# Patient Record
Sex: FEMALE | Race: WHITE | Employment: OTHER | URBAN - METROPOLITAN AREA
[De-identification: names, ages, dates, MRNs, and addresses within clinical notes are randomized per-mention and may not be internally consistent; named-entity substitution may affect disease eponyms.]

---

## 2019-10-15 ENCOUNTER — HOSPITAL ENCOUNTER (OUTPATIENT)
Dept: LAB | Age: 64
Discharge: HOME | End: 2019-10-15
Payer: COMMERCIAL

## 2019-10-15 LAB
ALBUMIN SERPL-MCNC: 3.9 G/DL (ref 3.4–5)
ALBUMIN/GLOB SERPL: 1.4 {RATIO} (ref 0.8–1.7)
ALP SERPL-CCNC: 75 U/L (ref 45–117)
ALT SERPL-CCNC: 17 U/L (ref 13–56)
ANION GAP SERPL CALC-SCNC: 7 MMOL/L (ref 3–18)
AST SERPL-CCNC: 16 U/L (ref 10–38)
BILIRUB SERPL-MCNC: 0.3 MG/DL (ref 0.2–1)
BUN SERPL-MCNC: 20 MG/DL (ref 7–18)
BUN/CREAT SERPL: 20 (ref 12–20)
CALCIUM SERPL-MCNC: 8.8 MG/DL (ref 8.5–10.1)
CHLORIDE SERPL-SCNC: 109 MMOL/L (ref 100–111)
CHOLEST SERPL-MCNC: 201 MG/DL
CO2 SERPL-SCNC: 26 MMOL/L (ref 21–32)
CREAT SERPL-MCNC: 1.02 MG/DL (ref 0.6–1.3)
ERYTHROCYTE [DISTWIDTH] IN BLOOD BY AUTOMATED COUNT: 13.9 % (ref 11.6–14.5)
GLOBULIN SER CALC-MCNC: 2.8 G/DL (ref 2–4)
GLUCOSE SERPL-MCNC: 82 MG/DL (ref 74–99)
HCT VFR BLD AUTO: 42.1 % (ref 35–45)
HDLC SERPL-MCNC: 59 MG/DL (ref 40–60)
HDLC SERPL: 3.4 {RATIO} (ref 0–5)
HGB BLD-MCNC: 13.7 G/DL (ref 12–16)
LDLC SERPL CALC-MCNC: 125.6 MG/DL (ref 0–100)
LIPID PROFILE,FLP: ABNORMAL
MAGNESIUM SERPL-MCNC: 1.7 MG/DL (ref 1.6–2.6)
MCH RBC QN AUTO: 29.4 PG (ref 24–34)
MCHC RBC AUTO-ENTMCNC: 32.5 G/DL (ref 31–37)
MCV RBC AUTO: 90.3 FL (ref 74–97)
PLATELET # BLD AUTO: 227 K/UL (ref 135–420)
PMV BLD AUTO: 11.3 FL (ref 9.2–11.8)
POTASSIUM SERPL-SCNC: 4.8 MMOL/L (ref 3.5–5.5)
PROT SERPL-MCNC: 6.7 G/DL (ref 6.4–8.2)
RBC # BLD AUTO: 4.66 M/UL (ref 4.2–5.3)
SODIUM SERPL-SCNC: 142 MMOL/L (ref 136–145)
TRIGL SERPL-MCNC: 82 MG/DL (ref ?–150)
TSH SERPL DL<=0.05 MIU/L-ACNC: 0.56 UIU/ML (ref 0.36–3.74)
VLDLC SERPL CALC-MCNC: 16.4 MG/DL
WBC # BLD AUTO: 4.9 K/UL (ref 4.6–13.2)

## 2019-10-15 PROCEDURE — 85027 COMPLETE CBC AUTOMATED: CPT

## 2019-10-15 PROCEDURE — 84443 ASSAY THYROID STIM HORMONE: CPT

## 2019-10-15 PROCEDURE — 80053 COMPREHEN METABOLIC PANEL: CPT

## 2019-10-15 PROCEDURE — 80061 LIPID PANEL: CPT

## 2019-10-15 PROCEDURE — 36415 COLL VENOUS BLD VENIPUNCTURE: CPT

## 2019-10-15 PROCEDURE — 83735 ASSAY OF MAGNESIUM: CPT

## 2020-12-16 ENCOUNTER — TRANSCRIBE ORDER (OUTPATIENT)
Dept: SCHEDULING | Age: 65
End: 2020-12-16

## 2020-12-16 DIAGNOSIS — M54.9 DORSALGIA: ICD-10-CM

## 2020-12-16 DIAGNOSIS — M54.50 LUMBAGO: Primary | ICD-10-CM

## 2020-12-31 ENCOUNTER — HOSPITAL ENCOUNTER (OUTPATIENT)
Dept: MRI IMAGING | Age: 65
Discharge: HOME | End: 2020-12-31
Attending: ORTHOPAEDIC SURGERY
Payer: COMMERCIAL

## 2020-12-31 DIAGNOSIS — M54.9 DORSALGIA: ICD-10-CM

## 2020-12-31 DIAGNOSIS — M54.50 LUMBAGO: ICD-10-CM

## 2020-12-31 PROCEDURE — 72148 MRI LUMBAR SPINE W/O DYE: CPT

## 2021-11-22 RX ORDER — ATROPINE SULFATE 0.1 MG/ML
0.5 INJECTION INTRAVENOUS
Status: CANCELLED | OUTPATIENT
Start: 2021-11-22 | End: 2021-11-23

## 2021-11-22 RX ORDER — DIPHENHYDRAMINE HYDROCHLORIDE 50 MG/ML
50 INJECTION, SOLUTION INTRAMUSCULAR; INTRAVENOUS ONCE
Status: CANCELLED | OUTPATIENT
Start: 2021-11-22 | End: 2021-11-22

## 2021-11-22 RX ORDER — EPINEPHRINE 0.1 MG/ML
1 INJECTION INTRACARDIAC; INTRAVENOUS
Status: CANCELLED | OUTPATIENT
Start: 2021-11-22 | End: 2021-11-23

## 2021-11-22 RX ORDER — SODIUM CHLORIDE 0.9 % (FLUSH) 0.9 %
5-40 SYRINGE (ML) INJECTION EVERY 8 HOURS
Status: CANCELLED | OUTPATIENT
Start: 2021-11-22

## 2021-11-22 RX ORDER — DEXTROMETHORPHAN/PSEUDOEPHED 2.5-7.5/.8
1.2 DROPS ORAL
Status: CANCELLED | OUTPATIENT
Start: 2021-11-22

## 2021-11-22 RX ORDER — SODIUM CHLORIDE 0.9 % (FLUSH) 0.9 %
5-40 SYRINGE (ML) INJECTION AS NEEDED
Status: CANCELLED | OUTPATIENT
Start: 2021-11-22

## 2021-11-22 NOTE — H&P
Assessment/Plan  # Detail Type Description    1. Assessment Personal history of colonic polyps (Z86.010). Impression Last Colonoscopy  1/09/2015 Impression /Pathology revealed: Moderate tortuosity of the sigmoid colon was found. Moderate diverticulosis found in the sigmoid. Muscular hypertonia. A single polyp in the descending colon removed by cold snare polypectomy. Internal hemorrhoids found. Normal mucosa    Colon descending: Adenomatous polyp fragments. .    Patient Plan she has one bm 2 to 3 times daily. no abdominal surgery. no fx hx of colon cancer. she is due to have a repeat colonoscopy. I explained to her the procedure of colonoscopy and the risks involved which include but not limited to reaction to sedation, bleeding, perforation, infection or missing a lesion if bowels are not well prepped or are unusually tortuous. she agreed to proceed with the procedure and answered her questions. I gave her the Suprep to clean her bowels. I advised her to take  extra laxatives for few days before incase she is on the constipated side to assure adequate bowel prep.  she has no dm , CAD or CVA         2. Assessment GERD w/o esophagitis (K21.9). Patient Plan has frequent heartburns specially when she takes NSAID's she doesn't take any medication to treat it because afraid of the side effect. told her beside watching her diet and not eating at night she should minimize her intake of NSAID's and if necessary it has to be taken with PPI to protect her esophagus. But also she needs to treat her symptoms now daily for 2 to 4 weeks and then as needed. she should lift the head of the bed with a wedge. .. she denied having n/vx or dysphagia. she has gained weight. in my opinion no need to repeat her EGD the one she had on Jan 9, 2015 showed mild localized grade A erosive esophagitis and tiny hiatal hernia.  gastric biopsy was negative.     I encouraged her to exercise on regular basis to improve her joint problem and loose some weight instead of relaying on Taking NSAID's that do not work. she has t treat her GERD by taking PPI initially daily and then as needed         3. Assessment Body mass index [BMI] 35.0-35.9, adult (Z68.35). Patient Plan High BMI 35.7. I encouraged her to continue watching her diet and exercising. I gave her some practical advices and tips to achieve this. This 61year old  patient was referred by Rock Larson. This 61year old female presents for Hx polyp/colon cancer and GERD. History of Present Illness  1. Hx polyp/colon cancer   Prior screening:  colonoscopy. Risk Factors: family history - parent and Father colon cancer. Associated symptoms include constipation. Pertinent negatives include abdominal pain, change in bowel habits, decreased appetite, diarrhea, melena, nausea, vomiting and weight loss. Additional information: Patient has family history of colon cancer and Last Colonoscopy 01//09/2015. hx of adenomatous polyps. 5 yrs recall Bm. 1 daily. 2.  GERD   The onset of the heartburn was 20 years ago. The problem is getting worse. The patient reports heartburn. It occurs daily. The symptoms are aggravated by eating, tomatoes and chocolate. Associated symptoms include chronic cough and reflux. Pertinent negatives include melena, nausea, sore throat, vomiting and weight loss.           Problem List  Problem Description Onset Date Chronic Clinical Status Notes   Gastroesophageal reflux disease 12/03/2014 N     Dysphagia 12/03/2014 N     History of polyp of colon 12/03/2014 N     Eruption 12/03/2014 N     Obesity 12/03/2014 N     Diverticular disease of colon 01/28/2015 N     Dysphagia 01/28/2015 N     History of polyp of colon 01/28/2015 N     Gastroesophageal reflux disease 01/28/2015 N     Hypothyroidism 02/05/2013 Y     Acquired hypothyroidism 08/12/2011 Y     Depressive disorder 08/12/2011 Y     Gastroesophageal reflux disease 08/12/2011 Y     Degenerative joint disease involving multiple joints 2011 Y       Past Medical/Surgical History   (Detailed)  Disease/disorder Onset Date Management Date Comments     right total knee replacement       tummy tuck       left shoulder surg 1977    GERD       Thyroid disease       Depression       Anxiety             Family History   (Detailed)    Relationship Family Member Name  Age at Death Condition Onset Age Cause of Death   Father    Hypertension  N   Father    Alcoholism  N   Father  Y    N   Father  Y  Cancer, colon  Y   Mother    Atrial Fibrillation  N   Mother    Thyroid disorder  N     Social History  (Detailed)  Tobacco use reviewed. Preferred language is [place]. The patient does not need an . Education/Employment/Occupation  Employment History Status Retired Restrictions   RN         Marital Status/Family/Social Support  Marital status:      Tobacco use status: Never smoked tobacco.    Smoking status: Never smoker. Tobacco Screening  Patient has never used tobacco. Patient has not used tobacco in the last 30 days. Patient has not used smokeless tobacco in the last 30 days. Smoking Status  Type Smoking Status Usage Per Day Years Used Pack Years Total Pack Years    Never smoker         Tobacco/Vaping Exposure  No passive smoke exposure. Alcohol  There is a history of alcohol use. consumed rarely. Caffeine  The patient uses caffeine: coffee. Lifestyle  Moderate activity level. Exercise includes walking.       Medications (active prior to today)  Medication Instructions Start Date Stop Date Refilled Elsewhere   Vitamin D3 1,000 unit tablet take 2 tablet by oral route  every day //   Y   turmeric root extract 500 mg capsule  //   Y   Fish Oil 1,000 mg (120 mg-180 mg) capsule  //   Y   CoQ-10 100 mg capsule  //   Y   OMEPRAZOLE DR CAPS 20MG TAKE 1 CAPSULE DAILY 30 MINUTES TO 1 HOUR BEFORE A MEAL 02/10/2020  02/10/2020 N   Valium 5 mg tablet TAKE 1 TABLET BY MOUTH EVERY DAY AS NEEDED 08/05/2020 08/05/2020 N   triamcinolone acetonide 0.1 % topical cream apply by topical route 2 times every day a thin layer to the affected area(s) 10/22/2020   N   SYNTHROID TABS 150MCG TAKE 1 TABLET DAILY 11/09/2020 11/09/2020 N   BUPROPION HCL TABS 75MG TAKE 1 TABLET TWICE A DAY 11/09/2020 11/09/2020 N       Medication Reconciliation  Medications reconciled today.     Medication Reviewed  Adherence Medication Name Sig Desc Elsewhere Status   taking as directed Fish Oil 1,000 mg (120 mg-180 mg) capsule  Y Verified   taking as directed SYNTHROID TABS 150MCG TAKE 1 TABLET DAILY N Verified   taking as directed turmeric root extract 500 mg capsule  Y Verified   taking as directed BUPROPION HCL TABS 75MG TAKE 1 TABLET TWICE A DAY N Verified   taking as directed Vitamin D3 1,000 unit tablet take 2 tablet by oral route  every day Y Verified   taking as directed triamcinolone acetonide 0.1 % topical cream apply by topical route 2 times every day a thin layer to the affected area(s) N Verified   taking as directed Valium 5 mg tablet TAKE 1 TABLET BY MOUTH EVERY DAY AS NEEDED N Verified   taking as directed OMEPRAZOLE DR CAPS 20MG TAKE 1 CAPSULE DAILY 30 MINUTES TO 1 HOUR BEFORE A MEAL N Verified   taking as directed CoQ-10 100 mg capsule  Y Verified     Medications (Added, Continued or Stopped today)  Start Date Medication Directions PRN Status PRN Reason Instruction Stop Date   11/09/2020 BUPROPION HCL TABS 75MG TAKE 1 TABLET TWICE A DAY N       CoQ-10 100 mg capsule  N       Fish Oil 1,000 mg (120 mg-180 mg) capsule  N      09/21/2021 Golytely 236 gram-22.74 gram-6.74 gram-5.86 gram oral solution take as prescribe by physician N      02/10/2020 OMEPRAZOLE DR CAPS 20MG TAKE 1 CAPSULE DAILY 30 MINUTES TO 1 HOUR BEFORE A MEAL N      11/09/2020 SYNTHROID TABS 150MCG TAKE 1 TABLET DAILY N      10/22/2020 triamcinolone acetonide 0.1 % topical cream apply by topical route 2 times every day a thin layer to the affected area(s) N       turmeric root extract 500 mg capsule  N      08/05/2020 Valium 5 mg tablet TAKE 1 TABLET BY MOUTH EVERY DAY AS NEEDED N       Vitamin D3 1,000 unit tablet take 2 tablet by oral route  every day N        Allergies  Ingredient Reaction (Severity) Medication Name Comment   ERYTHROMYCIN BASE Unknown       Reviewed, no changes. Orders  Status Lab Order Time Frame Comments   scheduled Referral: [facility]. Evaluate and treat. result received CBC with Diff     result received TSH     result received Lipid Measured LDL     scheduled Referral: Lara Abdi MD. Consult. [address]    insurance card, picture ID, all medications   scheduled * MRI LUMBAR SPINE W/O DYE     scheduled * THORACIC SPINE W/O DYE     result received CMP     result received TSH     result received Lipid Measured LDL     result received Urine Culture     scheduled Nerve Conduction 5-6 Studies Right wrist  [address]    [phone]    arrival time 2:45   result received Total T4     result received TSH     result received Lipid with LDL/HDL Ratio AU     result received Basic Metabolic Panel (8) AU     result received FSH, Serum     ordered MRI CERVICAL SPINE W/O DYE     scheduled Referrals: Neurosurgery. Cr Moore MD. Location: . Consult     ordered Lipid with LDL/HDL Ratio AU     ordered TSH     ordered Basic Metabolic Panel (8) AU     ordered CBC With Differential/Platelet     result received Basic Metabolic Panel (8) AU     result received CBC With Differential/Platelet     result received Lipid with LDL/HDL Ratio AU     result received TSH     scheduled Referrals: Sleep Health.  Chele Ashley MD. Consult     result received Home Sleep Study     ordered follow-up visit with Alana Ponce MD in 3 Weeks in 3 Weeks    result received CHEST X-RAY PA & LAT     result received Complete PFT     ordered Auto CPAP (straight pressure or with expiratory relief technology)  Auto Range: 5 X20; then download. Additional Instructions:  Setup. New machine. Ramp PRN. Special Instructions/Comments: DME- MED  Patient request.   ordered Heated Humidity     ordered Nasal Mask     ordered Nasal Cushion Replacement     ordered Nasal Pillow Replacement     ordered Headgear     ordered Tubing     ordered Heated wire tubing     ordered Disposable Filters     ordered Non-disposable filters     ordered Water chamber     ordered Chinstrap     ordered CPAP Mask of Choice     ordered follow-up visit with Abdulaziz Schultz MD in 3 Weeks in 3 Weeks    result received CT CHEST W/O DYE  Please schedule at [facility]   scheduled Referrals: Otolaryngology. Bree Martinez MD. Evaluate and treat  Please evaluate the patient for chronic cough. Normal PFT and no response to ICS/LABA inhaler. GERD controlled on PPI. ordered follow-up visit with Abdulaziz Schultz MD in 2 Months in 2 Months    ordered Auto CPAP (straight pressure or with expiratory relief technology)  Auto Range: 5 X10; then download. Ramp PRN. Special Instructions/Comments: Start up pressure 4 cwp. Refit for the mask of choice. Intolerance to CPAP and mask. .   ordered Heated Humidity     ordered Nasal Mask     ordered Nasal Cushion Replacement     ordered Nasal Pillow Replacement     ordered Headgear     ordered Tubing     ordered Heated wire tubing     ordered Disposable Filters     ordered Non-disposable filters     ordered Water chamber     ordered Chinstrap     ordered CPAP Mask of Choice     ordered Methacholine Challenge Test     ordered Complete PFT     result received TSH     result received Vitamin D 25     result received CBC With Differential/Platelet     result received Comp Metabolic Panel (14) AU     result received Lipid Panel calc LDL     result received HCV Antibody     ordered Comp Metabolic Panel (14) AU     ordered CBC, Platelet: No Differential     ordered Lipid Panel calc LDL     ordered Magnesium, Serum AU     ordered TSH     ordered Magnesium, Serum AU     ordered TSH     ordered Thyroxine (T4) Free, Direct     ordered CBC With Differential/Platelet     ordered Comp Metabolic Panel (14) AU     ordered Lipid Panel calc LDL     ordered Triiodothryonine (T3)     ordered CBC, Platelet: No Differential     ordered Comp Metabolic Panel (14) AU     ordered Lipid Panel calc LDL     ordered TSH     ordered Thyroxine (T4) Free, Direct     ordered Magnesium, Serum AU     ordered Triiodothryonine (T3)     ordered Basic Metabolic Panel (8) AU     ordered Lipid Panel calc LDL     ordered Magnesium, Serum AU     ordered TSH         Review of Systems  System Neg/Pos Details   Constitutional Negative Chills, Fever, Malaise and Weight loss. ENMT Negative Ear infections, Nasal congestion, Sinus Infection and Sore throat. Eyes Negative Double vision and Eye pain. Respiratory Positive Chronic cough. Respiratory Negative Asthma, Pleuritic pain and Wheezing. Cardio Negative Chest pain, Edema and Irregular heartbeat/palpitations. GI Positive Constipation, Reflux. GI Negative Abdominal pain, Change in bowel habits, Decreased appetite, Diarrhea, Heartburn, Hematemesis, Hematochezia, Melena, Nausea and Vomiting.  Negative Dysuria, Hematuria, Urinary frequency, Urinary incontinence and Urinary retention. Endocrine Negative Cold intolerance, Heat intolerance and Increased thirst.   Neuro Negative Dizziness, Headache, Numbness, Tremors and Vertigo. Psych Negative Anxiety, Depression and Increased stress. Integumentary Negative Hives, Pruritus and Rash. MS Negative Back pain, Joint pain and Myalgia. Hema/Lymph Negative Easy bleeding, Easy bruising and Lymphadenopathy. Reproductive Negative Breast lumps, Breast pain and Vaginal discharge.        Vital Signs   Height  Time ft in cm Last Measured Height Position   2:04 PM 5.0 7.00 170.18 01/08/2018      Weight/BSA/BMI  Time lb oz kg Context BMI kg/m2 BSA m2 2:04 .00  103.419 dressed with shoes 35.71      Date/Time Temp Pulse BP Arterial Line 1 BP (mmHg) BP Patient Position Resp SpO2 O2 Device O2 Flow Rate (L/min) Level of Consciousness MEWS Score Weight   11/23/21 0844        None (Room air)       11/23/21 0836 97.5 °F (36.4 °C) 77 125/81  At rest; Supine 18 97 % None (Room air)  Alert (0) 1 100.7 kg (222 lb 1.6 oz)       Physical  Exam  Exam Findings Details   Constitutional * Nourishment - obese. Constitutional Normal No acute distress. Well developed. Eyes Normal General - Right: Normal, Left: Normal. Conjunctiva - Right: Normal, Left: Normal. Sclera - Right: Normal, Left: Normal. Cornea - Right: Normal, Left: Normal. Pupil - Right: Normal, Left: Normal.   Nose/Mouth/Throat Normal Lips/teeth/gums - Normal. Tongue - Normal. Buccal mucosa - Normal. Palate & uvula - Normal.   Neck Exam Normal Inspection - Normal. Palpation - Normal. Thyroid gland - Normal. Cervical lymph nodes - Normal.   Respiratory Normal Inspection - Normal. Auscultation - Normal. Percussion - Normal. Cough - Absent. Effort - Normal.   Cardiovascular Normal Heart rate - Regular rate. Heart sounds - Normal S1, Normal S2. Murmurs - None. Extremities - No edema. Abdomen Normal Inspection - Normal. Appliance(s) - None. Abdominal muscles - Normal. Auscultation - Normal. Percussion - Normal. Anterior palpation - Normal, No guarding, No rebound. CVA tenderness - None. Umbilicus - Normal. Abdominal reflexes - Normal. No abdominal tenderness. No hepatic enlargement. No splenic enlargement. No hernia. No Ascites. No palpable mass. Shi's sign - Negative. Skin Normal Inspection - Normal.   Musculoskeletal Normal Hands - Left: Normal, Right: Normal.   Extremity Normal No cyanosis. No edema. Clubbing - Absent.    Neurological Normal Level of consciousness - Normal. Orientation - Normal. Memory - Normal. Motor - Normal. Balance & gait - Normal. Coordination - Normal. Fine motor skills - Normal. DTRs - Normal.   Psychiatric Normal Orientation - Oriented to time, place, person & situation. Not anxious. Appropriate mood and affect. Behavior appropriate for age. Sufficient language. No memory loss.      Active Patient Care Team Members  Name Contact Agency Type Support Role Relationship Active Date Inactive Date Specialty   Eder Joyner   encounter provider    Pulmonary Medicine   Harsha Boston   Patient provider PCP   Family Practice   Bia Hernandez   encounter provider    Gastroenterology   Newton Sicard   Emergency Contact Spouse      Newton Sicard    Spouse            No change in H&P

## 2021-11-23 ENCOUNTER — HOSPITAL ENCOUNTER (OUTPATIENT)
Age: 66
Setting detail: OUTPATIENT SURGERY
Discharge: HOME | End: 2021-11-23
Attending: INTERNAL MEDICINE | Admitting: INTERNAL MEDICINE
Payer: COMMERCIAL

## 2021-11-23 VITALS
DIASTOLIC BLOOD PRESSURE: 78 MMHG | HEIGHT: 66 IN | OXYGEN SATURATION: 97 % | SYSTOLIC BLOOD PRESSURE: 102 MMHG | TEMPERATURE: 97.5 F | RESPIRATION RATE: 18 BRPM | BODY MASS INDEX: 35.69 KG/M2 | WEIGHT: 222.1 LBS | HEART RATE: 84 BPM

## 2021-11-23 PROCEDURE — 76040000007: Performed by: INTERNAL MEDICINE

## 2021-11-23 PROCEDURE — 77030039961 HC KT CUST COLON BSC -D: Performed by: INTERNAL MEDICINE

## 2021-11-23 PROCEDURE — 74011250636 HC RX REV CODE- 250/636: Performed by: INTERNAL MEDICINE

## 2021-11-23 PROCEDURE — 99153 MOD SED SAME PHYS/QHP EA: CPT | Performed by: INTERNAL MEDICINE

## 2021-11-23 PROCEDURE — 77030040361 HC SLV COMPR DVT MDII -B: Performed by: INTERNAL MEDICINE

## 2021-11-23 PROCEDURE — 2709999900 HC NON-CHARGEABLE SUPPLY: Performed by: INTERNAL MEDICINE

## 2021-11-23 PROCEDURE — 77010033678 HC OXYGEN DAILY

## 2021-11-23 PROCEDURE — G0500 MOD SEDAT ENDO SERVICE >5YRS: HCPCS | Performed by: INTERNAL MEDICINE

## 2021-11-23 RX ORDER — FENTANYL CITRATE 50 UG/ML
100 INJECTION, SOLUTION INTRAMUSCULAR; INTRAVENOUS
Status: DISCONTINUED | OUTPATIENT
Start: 2021-11-23 | End: 2021-11-23 | Stop reason: HOSPADM

## 2021-11-23 RX ORDER — FLUMAZENIL 0.1 MG/ML
0.2 INJECTION INTRAVENOUS
Status: DISCONTINUED | OUTPATIENT
Start: 2021-11-23 | End: 2021-11-23 | Stop reason: HOSPADM

## 2021-11-23 RX ORDER — SODIUM CHLORIDE 9 MG/ML
1000 INJECTION, SOLUTION INTRAVENOUS CONTINUOUS
Status: DISCONTINUED | OUTPATIENT
Start: 2021-11-23 | End: 2021-11-23 | Stop reason: HOSPADM

## 2021-11-23 RX ORDER — MIDAZOLAM HYDROCHLORIDE 1 MG/ML
.25-5 INJECTION, SOLUTION INTRAMUSCULAR; INTRAVENOUS
Status: DISCONTINUED | OUTPATIENT
Start: 2021-11-23 | End: 2021-11-23 | Stop reason: HOSPADM

## 2021-11-23 RX ORDER — NALOXONE HYDROCHLORIDE 0.4 MG/ML
0.4 INJECTION, SOLUTION INTRAMUSCULAR; INTRAVENOUS; SUBCUTANEOUS
Status: DISCONTINUED | OUTPATIENT
Start: 2021-11-23 | End: 2021-11-23 | Stop reason: HOSPADM

## 2021-11-23 RX ADMIN — SODIUM CHLORIDE 1000 ML: 9 INJECTION, SOLUTION INTRAVENOUS at 08:56

## 2021-11-23 NOTE — DISCHARGE INSTRUCTIONS
Vera Wesley  [id number]  1955    COLON DISCHARGE INSTRUCTIONS    Discomfort:  Redness at IV site- apply warm compress to area; if redness or soreness persist- contact your physician  There may be a slight amount of blood passed from the rectum  Gaseous discomfort- walking, belching will help relieve any discomfort  You may not operate a vehicle til the next day. You may not engage in an occupation involving machinery or appliances til the next day. You may not drink alcoholic beverages til the next day. DIET:   High fiber diet. ACTIVITY:  You may not  resume your normal daily activities til the next day. it is recommended that you spend the remainder of the day resting -  avoid any strenuous activity. CALL M.D.  IF ANY SIGN OF:   Increasing pain, nausea, vomiting  Abdominal distension (swelling)  New increased bleeding (oral or rectal)  Fever (chills)  Pain in chest area  Bloody discharge from nose or mouth  Shortness of breath    You may  take any Advil, Aspirin, Ibuprofen, Motrin, Aleve, or Goodys but preferably Tylenol as needed for pain. Post procedure diagnosis:  sigmoid diverticulosis; Follow-up Instructions: Your follow up colonoscopy will be in 5 years. We will notify you the results of your biopsy by letter within 2 weeks. Komal Javier MD  November 23, 2021     DISCHARGE SUMMARY from Nurse    PATIENT INSTRUCTIONS:    After general anesthesia or intravenous sedation, for 24 hours or while taking prescription Narcotics:  · Limit your activities  · Do not drive and operate hazardous machinery  · Do not make important personal or business decisions  · Do  not drink alcoholic beverages  · If you have not urinated within 8 hours after discharge, please contact your surgeon on call.     Report the following to your surgeon:  · Excessive pain, swelling, redness or odor of or around the surgical area  · Temperature over 100.5  · Nausea and vomiting lasting longer than 4 hours or if unable to take medications  · Any signs of decreased circulation or nerve impairment to extremity: change in color, persistent  numbness, tingling, coldness or increase pain  · Any questions    What to do at Home:  Recommended activity: Activity as tolerated, noted above    If you experience any of the following symptoms as above, please follow up with Dr Jacob Ferraro. *  Please give a list of your current medications to your Primary Care Provider. *  Please update this list whenever your medications are discontinued, doses are      changed, or new medications (including over-the-counter products) are added. *  Please carry medication information at all times in case of emergency situations. These are general instructions for a healthy lifestyle:    No smoking/ No tobacco products/ Avoid exposure to second hand smoke  Surgeon General's Warning:  Quitting smoking now greatly reduces serious risk to your health. Obesity, smoking, and sedentary lifestyle greatly increases your risk for illness    A healthy diet, regular physical exercise & weight monitoring are important for maintaining a healthy lifestyle    You may be retaining fluid if you have a history of heart failure or if you experience any of the following symptoms:  Weight gain of 3 pounds or more overnight or 5 pounds in a week, increased swelling in our hands or feet or shortness of breath while lying flat in bed. Please call your doctor as soon as you notice any of these symptoms; do not wait until your next office visit. The discharge information has been reviewed with the patient and spouse. The patient and spouse verbalized understanding. Discharge medications reviewed with the patient and spouse and appropriate educational materials and side effects teaching were provided.   Patient armband removed and shredded    ___________________________________________________________________________________________________________________________________

## 2021-11-23 NOTE — PROCEDURES
[facility]  Colonoscopy Procedure Report  _______________________________________________________  Patient: Fritz Richey                                        Attending Physician: Jamie Cavazos MD    Patient ID: [id number]                                    Referring Physician: Marycruz Espitia DO    Exam Date: 2021      Introduction: A  61 y.o. female patient, presents for inpatient Colonoscopy    Indications: Last Colonoscopy  2015 by myself: Moderate tortuosity of the sigmoid colon was found. Moderate diverticulosis found in the sigmoid. Muscular hypertonia. A single 4 mm Adenomatous polyp in the descending colon removed by cold snare polypectomy. Internal hemorrhoids found. Normal mucosa. She has one bm 2 to 3 times daily. no abdominal surgery. Father had hx of colon cancer and  in his fifties.       Consent: The benefits, risks, and alternatives to the procedure were discussed and informed consent was obtained from the patient. Preparation: EKG, pulse, pulse oximetry and blood pressure were monitored throughout the procedure. ASA Classification: Class II- . The heart is an S1-S2 and regular heart rate and rhythm. Lungs are clear to auscultation and percussion. Abdomen is soft, nondistended, and nontender. Mental Status: awake, alert, and oriented to person, place, and time    Medications:  · Fentanyl 100 mcg IV before procedure. · Versed 5 mg IV throughout the procedure. Rectal Exam: Normal Rectal Exam. No Blood. Pathology Specimens:  0    Procedure: The colonoscope was passed with ease through the anus under direct visualization and advanced to the cecum and 6 cm inside the terminal ileum. Retroflexion is made in the ascending colon. The scope was withdrawn and the mucosa was carefully examined. The quality of the preparation was excellent. The views were excellent. The patient's toleration of the procedure was excellent. The exam was done twice to the cecum. Total time is 18 minutes and withdrawal time is 15 minutes. Findings:    Rectum:   Small internal hemorrhoids. Sigmoid:   Slightly tortuous sigmoid colon with moderate sigmoid diverticulosis and muscular hypertonia. Descending Colon:   Normal   Transverse Colon:   Normal   Ascending Colon:   Normal  Cecum:   Normal  Terminal Ileum:   Normal.     Unplanned Events: There were no unplanned events. Estimated Blood Loss: None  IMPLANTS: * No implants in log *  Impressions: Small internal hemorrhoids. Slightly tortuous sigmoid colon with moderate sigmoid diverticulosis and muscular hypertonia. Normal Mucosa. No blood, polyps or AVM found. Complications: None; patient tolerated the procedure well. Recommendations:  · Discharge home when standard parameters are met. · Resume a high fiber diet. · Resume own medications. Avoid all NSAID's for  · Colonoscopy recommendation in 5 years because father  of colon cancer at age 46 yo.   · Take Miralax and/ or Colace 100 mg on regular basis if constipated    Procedure Codes:     COLONOSCOPY [[id number] (Type: Custom)]    Endoscope Information:  Model Number(s)    [id number]   Assistant: None  Signed By: Miguel A Fried MD Date: 2021

## 2022-12-23 ENCOUNTER — HOSPITAL ENCOUNTER (INPATIENT)
Age: 67
LOS: 2 days | Discharge: HOME | DRG: 176 | End: 2022-12-25
Attending: EMERGENCY MEDICINE | Admitting: HOSPITALIST
Payer: MEDICARE

## 2022-12-23 ENCOUNTER — HOSPITAL ENCOUNTER (OUTPATIENT)
Dept: LAB | Age: 67
DRG: 176 | End: 2022-12-23
Payer: MEDICARE

## 2022-12-23 ENCOUNTER — APPOINTMENT (OUTPATIENT)
Dept: GENERAL RADIOLOGY | Age: 67
DRG: 176 | End: 2022-12-23
Attending: EMERGENCY MEDICINE
Payer: MEDICARE

## 2022-12-23 ENCOUNTER — APPOINTMENT (OUTPATIENT)
Dept: CT IMAGING | Age: 67
DRG: 176 | End: 2022-12-23
Attending: PHYSICIAN ASSISTANT
Payer: MEDICARE

## 2022-12-23 DIAGNOSIS — N30.00 ACUTE CYSTITIS WITHOUT HEMATURIA: ICD-10-CM

## 2022-12-23 DIAGNOSIS — I26.99 ACUTE PULMONARY EMBOLISM WITHOUT ACUTE COR PULMONALE, UNSPECIFIED PULMONARY EMBOLISM TYPE (HCC): Primary | ICD-10-CM

## 2022-12-23 PROBLEM — E03.9 ACQUIRED HYPOTHYROIDISM: Status: ACTIVE | Noted: 2022-12-23

## 2022-12-23 PROBLEM — J45.901 ASTHMA EXACERBATION: Status: ACTIVE | Noted: 2022-12-23

## 2022-12-23 PROBLEM — N39.0 UTI (URINARY TRACT INFECTION): Status: ACTIVE | Noted: 2022-12-23

## 2022-12-23 LAB
ALBUMIN SERPL-MCNC: 3.1 G/DL (ref 3.4–5)
ALBUMIN/GLOB SERPL: 0.9 {RATIO} (ref 0.8–1.7)
ALP SERPL-CCNC: 96 U/L (ref 45–117)
ALT SERPL-CCNC: 39 U/L (ref 13–56)
ANION GAP SERPL CALC-SCNC: 7 MMOL/L (ref 3–18)
APPEARANCE UR: ABNORMAL
APTT PPP: 34 SEC (ref 23–36.4)
APTT PPP: >180 SEC (ref 23–36.4)
AST SERPL-CCNC: 29 U/L (ref 10–38)
B PERT DNA SPEC QL NAA+PROBE: NOT DETECTED
BACTERIA URNS QL MICRO: ABNORMAL /HPF
BASOPHILS # BLD: 0 K/UL (ref 0–0.1)
BASOPHILS # BLD: 0 K/UL (ref 0–0.1)
BASOPHILS NFR BLD: 0 % (ref 0–2)
BASOPHILS NFR BLD: 0 % (ref 0–2)
BILIRUB SERPL-MCNC: 0.6 MG/DL (ref 0.2–1)
BILIRUB UR QL: NEGATIVE
BNP SERPL-MCNC: 52 PG/ML (ref 0–900)
BORDETELLA PARAPERTUSSIS PCR, BORPAR: NOT DETECTED
BUN SERPL-MCNC: 15 MG/DL (ref 7–18)
BUN/CREAT SERPL: 14 (ref 12–20)
C PNEUM DNA SPEC QL NAA+PROBE: NOT DETECTED
CALCIUM SERPL-MCNC: 8.8 MG/DL (ref 8.5–10.1)
CHLORIDE SERPL-SCNC: 107 MMOL/L (ref 100–111)
CO2 SERPL-SCNC: 24 MMOL/L (ref 21–32)
COLOR UR: YELLOW
CREAT SERPL-MCNC: 1.1 MG/DL (ref 0.6–1.3)
D DIMER PPP FEU-MCNC: 2.02 UG/ML(FEU)
D DIMER PPP FEU-MCNC: 2.15 UG/ML(FEU)
DIFFERENTIAL METHOD BLD: ABNORMAL
DIFFERENTIAL METHOD BLD: ABNORMAL
EOSINOPHIL # BLD: 0 K/UL (ref 0–0.4)
EOSINOPHIL # BLD: 0.1 K/UL (ref 0–0.4)
EOSINOPHIL NFR BLD: 0 % (ref 0–5)
EOSINOPHIL NFR BLD: 1 % (ref 0–5)
EPITH CASTS URNS QL MICRO: ABNORMAL /LPF (ref 0–5)
ERYTHROCYTE [DISTWIDTH] IN BLOOD BY AUTOMATED COUNT: 13.1 % (ref 11.6–14.5)
ERYTHROCYTE [DISTWIDTH] IN BLOOD BY AUTOMATED COUNT: 13.2 % (ref 11.6–14.5)
FLUAV H1 2009 PAND RNA SPEC QL NAA+PROBE: NOT DETECTED
FLUAV H1 RNA SPEC QL NAA+PROBE: NOT DETECTED
FLUAV H3 RNA SPEC QL NAA+PROBE: NOT DETECTED
FLUAV SUBTYP SPEC NAA+PROBE: NOT DETECTED
FLUBV RNA SPEC QL NAA+PROBE: NOT DETECTED
GLOBULIN SER CALC-MCNC: 3.6 G/DL (ref 2–4)
GLUCOSE SERPL-MCNC: 116 MG/DL (ref 74–99)
GLUCOSE UR STRIP.AUTO-MCNC: NEGATIVE MG/DL
HADV DNA SPEC QL NAA+PROBE: NOT DETECTED
HCOV 229E RNA SPEC QL NAA+PROBE: NOT DETECTED
HCOV HKU1 RNA SPEC QL NAA+PROBE: NOT DETECTED
HCOV NL63 RNA SPEC QL NAA+PROBE: NOT DETECTED
HCOV OC43 RNA SPEC QL NAA+PROBE: NOT DETECTED
HCT VFR BLD AUTO: 36.6 % (ref 35–45)
HCT VFR BLD AUTO: 38.3 % (ref 35–45)
HGB BLD-MCNC: 12.3 G/DL (ref 12–16)
HGB BLD-MCNC: 12.9 G/DL (ref 12–16)
HGB UR QL STRIP: ABNORMAL
HMPV RNA SPEC QL NAA+PROBE: NOT DETECTED
HPIV1 RNA SPEC QL NAA+PROBE: NOT DETECTED
HPIV2 RNA SPEC QL NAA+PROBE: NOT DETECTED
HPIV3 RNA SPEC QL NAA+PROBE: NOT DETECTED
HPIV4 RNA SPEC QL NAA+PROBE: NOT DETECTED
IMM GRANULOCYTES # BLD AUTO: 0 K/UL (ref 0–0.04)
IMM GRANULOCYTES # BLD AUTO: 0.1 K/UL (ref 0–0.04)
IMM GRANULOCYTES NFR BLD AUTO: 1 % (ref 0–0.5)
IMM GRANULOCYTES NFR BLD AUTO: 1 % (ref 0–0.5)
INR PPP: 1 (ref 0.8–1.2)
KETONES UR QL STRIP.AUTO: NEGATIVE MG/DL
LEUKOCYTE ESTERASE UR QL STRIP.AUTO: ABNORMAL
LYMPHOCYTES # BLD: 0.8 K/UL (ref 0.9–3.6)
LYMPHOCYTES # BLD: 2.1 K/UL (ref 0.9–3.6)
LYMPHOCYTES NFR BLD: 20 % (ref 21–52)
LYMPHOCYTES NFR BLD: 9 % (ref 21–52)
M PNEUMO DNA SPEC QL NAA+PROBE: NOT DETECTED
MCH RBC QN AUTO: 29.8 PG (ref 24–34)
MCH RBC QN AUTO: 29.9 PG (ref 24–34)
MCHC RBC AUTO-ENTMCNC: 33.6 G/DL (ref 31–37)
MCHC RBC AUTO-ENTMCNC: 33.7 G/DL (ref 31–37)
MCV RBC AUTO: 88.5 FL (ref 78–100)
MCV RBC AUTO: 88.8 FL (ref 78–100)
MONOCYTES # BLD: 0.2 K/UL (ref 0.05–1.2)
MONOCYTES # BLD: 1 K/UL (ref 0.05–1.2)
MONOCYTES NFR BLD: 10 % (ref 3–10)
MONOCYTES NFR BLD: 2 % (ref 3–10)
NEUTS SEG # BLD: 7.2 K/UL (ref 1.8–8)
NEUTS SEG # BLD: 7.5 K/UL (ref 1.8–8)
NEUTS SEG NFR BLD: 70 % (ref 40–73)
NEUTS SEG NFR BLD: 88 % (ref 40–73)
NITRITE UR QL STRIP.AUTO: NEGATIVE
NRBC # BLD: 0 K/UL (ref 0–0.01)
NRBC # BLD: 0 K/UL (ref 0–0.01)
NRBC BLD-RTO: 0 PER 100 WBC
NRBC BLD-RTO: 0 PER 100 WBC
PH UR STRIP: 7.5 [PH] (ref 5–8)
PLATELET # BLD AUTO: 274 K/UL (ref 135–420)
PLATELET # BLD AUTO: 291 K/UL (ref 135–420)
PMV BLD AUTO: 10.1 FL (ref 9.2–11.8)
PMV BLD AUTO: 9.8 FL (ref 9.2–11.8)
POTASSIUM SERPL-SCNC: 4.3 MMOL/L (ref 3.5–5.5)
PROT SERPL-MCNC: 6.7 G/DL (ref 6.4–8.2)
PROT UR STRIP-MCNC: 30 MG/DL
PROTHROMBIN TIME: 13.5 SEC (ref 11.5–15.2)
RBC # BLD AUTO: 4.12 M/UL (ref 4.2–5.3)
RBC # BLD AUTO: 4.33 M/UL (ref 4.2–5.3)
RBC #/AREA URNS HPF: ABNORMAL /HPF (ref 0–5)
RSV RNA SPEC QL NAA+PROBE: NOT DETECTED
RV+EV RNA SPEC QL NAA+PROBE: NOT DETECTED
SARS-COV-2 PCR, COVPCR: NOT DETECTED
SODIUM SERPL-SCNC: 138 MMOL/L (ref 136–145)
SP GR UR REFRACTOMETRY: 1.02 (ref 1–1.03)
TROPONIN-HIGH SENSITIVITY: 5 NG/L (ref 0–54)
TSH SERPL DL<=0.05 MIU/L-ACNC: 0.43 UIU/ML (ref 0.36–3.74)
UROBILINOGEN UR QL STRIP.AUTO: 1 EU/DL (ref 0.2–1)
WBC # BLD AUTO: 10.7 K/UL (ref 4.6–13.2)
WBC # BLD AUTO: 8.2 K/UL (ref 4.6–13.2)
WBC URNS QL MICRO: ABNORMAL /HPF (ref 0–5)

## 2022-12-23 PROCEDURE — 93005 ELECTROCARDIOGRAM TRACING: CPT

## 2022-12-23 PROCEDURE — 36415 COLL VENOUS BLD VENIPUNCTURE: CPT

## 2022-12-23 PROCEDURE — 80053 COMPREHEN METABOLIC PANEL: CPT

## 2022-12-23 PROCEDURE — 85379 FIBRIN DEGRADATION QUANT: CPT

## 2022-12-23 PROCEDURE — 74011000250 HC RX REV CODE- 250: Performed by: PHYSICIAN ASSISTANT

## 2022-12-23 PROCEDURE — 81001 URINALYSIS AUTO W/SCOPE: CPT

## 2022-12-23 PROCEDURE — 85610 PROTHROMBIN TIME: CPT

## 2022-12-23 PROCEDURE — 87186 SC STD MICRODIL/AGAR DIL: CPT

## 2022-12-23 PROCEDURE — 85025 COMPLETE CBC W/AUTO DIFF WBC: CPT

## 2022-12-23 PROCEDURE — 87077 CULTURE AEROBIC IDENTIFY: CPT

## 2022-12-23 PROCEDURE — 0202U NFCT DS 22 TRGT SARS-COV-2: CPT

## 2022-12-23 PROCEDURE — 87086 URINE CULTURE/COLONY COUNT: CPT

## 2022-12-23 PROCEDURE — 74011000636 HC RX REV CODE- 636: Performed by: EMERGENCY MEDICINE

## 2022-12-23 PROCEDURE — 84443 ASSAY THYROID STIM HORMONE: CPT

## 2022-12-23 PROCEDURE — 84484 ASSAY OF TROPONIN QUANT: CPT

## 2022-12-23 PROCEDURE — 83880 ASSAY OF NATRIURETIC PEPTIDE: CPT

## 2022-12-23 PROCEDURE — 71275 CT ANGIOGRAPHY CHEST: CPT

## 2022-12-23 PROCEDURE — 94640 AIRWAY INHALATION TREATMENT: CPT

## 2022-12-23 PROCEDURE — 74011000258 HC RX REV CODE- 258: Performed by: PHYSICIAN ASSISTANT

## 2022-12-23 PROCEDURE — 99285 EMERGENCY DEPT VISIT HI MDM: CPT

## 2022-12-23 PROCEDURE — 74011250637 HC RX REV CODE- 250/637: Performed by: PHYSICIAN ASSISTANT

## 2022-12-23 PROCEDURE — 65270000029 HC RM PRIVATE

## 2022-12-23 PROCEDURE — 74011250636 HC RX REV CODE- 250/636: Performed by: PHYSICIAN ASSISTANT

## 2022-12-23 PROCEDURE — 96374 THER/PROPH/DIAG INJ IV PUSH: CPT

## 2022-12-23 PROCEDURE — 85730 THROMBOPLASTIN TIME PARTIAL: CPT

## 2022-12-23 PROCEDURE — 71045 X-RAY EXAM CHEST 1 VIEW: CPT

## 2022-12-23 RX ORDER — HEPARIN SODIUM 1000 [USP'U]/ML
80 INJECTION, SOLUTION INTRAVENOUS; SUBCUTANEOUS ONCE
Status: COMPLETED | OUTPATIENT
Start: 2022-12-23 | End: 2022-12-23

## 2022-12-23 RX ORDER — LEVOTHYROXINE SODIUM 75 UG/1
150 TABLET ORAL
Status: DISCONTINUED | OUTPATIENT
Start: 2022-12-24 | End: 2022-12-25 | Stop reason: HOSPADM

## 2022-12-23 RX ORDER — ALBUTEROL SULFATE 90 UG/1
2 AEROSOL, METERED RESPIRATORY (INHALATION)
COMMUNITY

## 2022-12-23 RX ORDER — PROMETHAZINE HYDROCHLORIDE 6.25 MG/5ML
6.25 SYRUP ORAL
COMMUNITY

## 2022-12-23 RX ORDER — IPRATROPIUM BROMIDE AND ALBUTEROL SULFATE 2.5; .5 MG/3ML; MG/3ML
3 SOLUTION RESPIRATORY (INHALATION)
Status: COMPLETED | OUTPATIENT
Start: 2022-12-23 | End: 2022-12-23

## 2022-12-23 RX ORDER — BUPROPION HYDROCHLORIDE 75 MG/1
75 TABLET ORAL DAILY
Status: DISCONTINUED | OUTPATIENT
Start: 2022-12-24 | End: 2022-12-25 | Stop reason: HOSPADM

## 2022-12-23 RX ORDER — HYDROCODONE POLISTIREX AND CHLORPHENIRAMINE POLISTIREX 10; 8 MG/5ML; MG/5ML
5 SUSPENSION, EXTENDED RELEASE ORAL
Status: COMPLETED | OUTPATIENT
Start: 2022-12-23 | End: 2022-12-23

## 2022-12-23 RX ORDER — DEXAMETHASONE SODIUM PHOSPHATE 10 MG/ML
10 INJECTION INTRAMUSCULAR; INTRAVENOUS
Status: COMPLETED | OUTPATIENT
Start: 2022-12-23 | End: 2022-12-23

## 2022-12-23 RX ORDER — ACETAMINOPHEN 325 MG/1
650 TABLET ORAL
COMMUNITY

## 2022-12-23 RX ORDER — MELATONIN
2000 DAILY
Status: DISCONTINUED | OUTPATIENT
Start: 2022-12-24 | End: 2022-12-25 | Stop reason: HOSPADM

## 2022-12-23 RX ORDER — HEPARIN SODIUM 10000 [USP'U]/100ML
18-36 INJECTION, SOLUTION INTRAVENOUS
Status: DISCONTINUED | OUTPATIENT
Start: 2022-12-23 | End: 2022-12-25

## 2022-12-23 RX ADMIN — CEFTRIAXONE 1 G: 1 INJECTION, POWDER, FOR SOLUTION INTRAMUSCULAR; INTRAVENOUS at 15:50

## 2022-12-23 RX ADMIN — HEPARIN SODIUM 18 UNITS/KG/HR: 10000 INJECTION, SOLUTION INTRAVENOUS at 19:31

## 2022-12-23 RX ADMIN — DEXAMETHASONE SODIUM PHOSPHATE 10 MG: 10 INJECTION, SOLUTION INTRAMUSCULAR; INTRAVENOUS at 13:13

## 2022-12-23 RX ADMIN — IPRATROPIUM BROMIDE AND ALBUTEROL SULFATE 3 ML: .5; 3 SOLUTION RESPIRATORY (INHALATION) at 13:13

## 2022-12-23 RX ADMIN — HEPARIN SODIUM 18 UNITS/KG/HR: 10000 INJECTION, SOLUTION INTRAVENOUS at 15:48

## 2022-12-23 RX ADMIN — SODIUM CHLORIDE 1000 ML: 9 INJECTION, SOLUTION INTRAVENOUS at 13:13

## 2022-12-23 RX ADMIN — IOPAMIDOL 100 ML: 755 INJECTION, SOLUTION INTRAVENOUS at 13:53

## 2022-12-23 RX ADMIN — HYDROCODONE POLISTIREX AND CHLORPHENIRAMINE POLISTIREX 5 ML: 10; 8 SUSPENSION, EXTENDED RELEASE ORAL at 13:13

## 2022-12-23 RX ADMIN — HEPARIN SODIUM 7620 UNITS: 1000 INJECTION INTRAVENOUS; SUBCUTANEOUS at 15:46

## 2022-12-23 NOTE — Clinical Note
Status[de-identified] INPATIENT [101]   Type of Bed: Medical [8]   Cardiac Monitoring Required?: No   Inpatient Hospitalization Certified Necessary for the Following Reasons: 3.  Patient receiving treatment that can only be provided in an inpatient setting (further clarification in H&P documentation)   Admitting Diagnosis: Acute pulmonary embolism [facility]) [[id number]]   Admitting Diagnosis: UTI (urinary tract infection) [221707]   Admitting Diagnosis: Asthma exacerbation [[id number]]   Admitting Physician: Glo Monson [[id number]]   Attending Physician: Glo Monson [[id number]]   Estimated Length of Stay: 2 Midnights   Discharge Plan[de-identified] Home with Office Follow-up

## 2022-12-23 NOTE — ED PROVIDER NOTES
EMERGENCY DEPARTMENT HISTORY AND PHYSICAL EXAM    Date: 12/23/2022  Patient Name: Gurmeet Kraft    History of Presenting Illness     Chief Complaint   Patient presents with    Shortness of Breath         History Provided By: Patient    Chief Complaint: SOB, cough    HPI(Context):   12:34 PM  Gurmeet Kraft is a 74 y.o. female with PMHX of asthma, OA, GERD, hiatal hernia, rectocele, hypothyroid who presents to the emergency department C/O SOB. Associated sxs include cough and congestion. Sxs x 3 weeks. Pt tested positive for COVID and influenza on 12/01 after returning from [facility]. Pt is fully vaccinated for COVID with booster and has UTD influenza shot. Pt was placed on dexamethasone for 6 days with no relief. Pt has hx of asthma that only flares up with illness. Pt was sent to ED from Dr. Xavier Vitale office due to persistent sxs. Pt denies fever, CP, leg swelling, cardiac hx, hx of DVT/PE, and any other sxs or complaints. PCP: Reymundo Rosario DO    Current Facility-Administered Medications   Medication Dose Route Frequency Provider Last Rate Last Admin    cefTRIAXone (ROCEPHIN) 1 g in 0.9% sodium chloride (MBP/ADV) 50 mL MBP  1 g IntraVENous NOW Jeromy Kahn PA-C        heparin (porcine) 1,000 unit/mL injection 7,620 Units  80 Units/kg IntraVENous ONCE Jeromy Kahn PA-C        heparin 25,000 units in D5W 250 ml infusion  18-36 Units/kg/hr IntraVENous TITRATE Elissa Martinez PA-C         Current Outpatient Medications   Medication Sig Dispense Refill    cholecalciferol (VITAMIN D3) (1000 Units /25 mcg) tablet Take 2 Tabs by mouth.      levothyroxine (SYNTHROID) 150 mcg tablet Take 150 mcg by mouth Daily (before breakfast). buPROPion (WELLBUTRIN) 75 mg tablet Take 75 mg by mouth daily. pantoprazole (PROTONIX) 40 mg tablet Take 40 mg by mouth as needed.          Past History     Past Medical History:  Past Medical History:   Diagnosis Date    Arthritis     Family history of colon cancer in father     GERD (gastroesophageal reflux disease)     Hiatal hernia     Left knee DJD 2013    Menopause     age 48    Rectocele      (spontaneous vaginal delivery) 1982     (spontaneous vaginal delivery) 1985    Thyroid disease     Hypothyroid       Past Surgical History:  Past Surgical History:   Procedure Laterality Date    COLONOSCOPY N/A 2021    COLONOSCOPY performed by French Heimlich, MD at THE [facility] ENDOSCOPY    [address] [facility]    left dislocation of shoulder and pinning    HX ORTHOPAEDIC      right total knee replacement    HX ORTHOPAEDIC      left total knee replacement    HX OTHER SURGICAL      tummy tuck        HX OTHER SURGICAL  2004    breast lift       Family History:  Family History   Problem Relation Age of Onset    Post-op Nausea/Vomiting Mother     OSTEOARTHRITIS Mother     Colon Cancer Father     OSTEOARTHRITIS Brother     GERD Brother        Social History:  Social History     Tobacco Use    Smoking status: Never    Smokeless tobacco: Never   Vaping Use    Vaping Use: Never used   Substance Use Topics    Alcohol use: Yes     Comment: rare 3-6 glasses yearly    Drug use: No       Allergies: Allergies   Allergen Reactions    Erythromycin Nausea and Vomiting         Review of Systems   Review of Systems   Constitutional:  Negative for chills and fever. HENT:  Positive for congestion. Respiratory:  Positive for cough and shortness of breath. Negative for wheezing. Allergic/Immunologic: Negative for environmental allergies and immunocompromised state. All other systems reviewed and are negative. Physical Exam     Vitals:    22 1135 22 1209 22 1211   BP: 133/73  116/78   Pulse: (!) 108     Resp: 18     Temp: 97.3 °F (36.3 °C)     SpO2: 98% 98% 97%   Weight: 95.3 kg (210 lb)     Height: 5' 7\" (1.702 m)       Physical Exam  Vitals and nursing note reviewed. Constitutional:       General: She is not in acute distress. Appearance: She is well-developed. She is not diaphoretic. Comments:  female in NAD. Alert. Bronchospastic cough. HENT:      Head: Normocephalic and atraumatic. Right Ear: External ear normal.      Left Ear: External ear normal.      Nose: Nose normal.   Eyes:      General: No scleral icterus. Right eye: No discharge. Left eye: No discharge. Conjunctiva/sclera: Conjunctivae normal.   Cardiovascular:      Rate and Rhythm: Regular rhythm. Tachycardia present. Heart sounds: Normal heart sounds. No murmur heard. No friction rub. No gallop. Pulmonary:      Effort: Pulmonary effort is normal. No tachypnea, accessory muscle usage or respiratory distress. Breath sounds: Normal breath sounds. No decreased breath sounds, wheezing, rhonchi or rales. Musculoskeletal:         General: Normal range of motion. Cervical back: Normal range of motion. Right lower leg: No edema. Left lower leg: No edema. Skin:     General: Skin is warm and dry. Neurological:      Mental Status: She is alert and oriented to person, place, and time.    Psychiatric:         Judgment: Judgment normal.           Diagnostic Study Results     Labs -     Recent Results (from the past 12 hour(s))   D DIMER    Collection Time: 12/23/22 10:19 AM   Result Value Ref Range    D DIMER 2.02 (H) <0.46 ug/ml(FEU)   CBC WITH AUTOMATED DIFF    Collection Time: 12/23/22 12:30 PM   Result Value Ref Range    WBC 10.7 4.6 - 13.2 K/uL    RBC 4.33 4.20 - 5.30 M/uL    HGB 12.9 12.0 - 16.0 g/dL    HCT 38.3 35.0 - 45.0 %    MCV 88.5 78.0 - 100.0 FL    MCH 29.8 24.0 - 34.0 PG    MCHC 33.7 31.0 - 37.0 g/dL    RDW 13.2 11.6 - 14.5 %    PLATELET 046 998 - 144 K/uL    MPV 9.8 9.2 - 11.8 FL    NRBC 0.0 0  WBC    ABSOLUTE NRBC 0.00 0.00 - 0.01 K/uL    NEUTROPHILS 70 40 - 73 %    LYMPHOCYTES 20 (L) 21 - 52 %    MONOCYTES 10 3 - 10 %    EOSINOPHILS 1 0 - 5 %    BASOPHILS 0 0 - 2 %    IMMATURE GRANULOCYTES 1 (H) 0.0 - 0.5 %    ABS. NEUTROPHILS 7.5 1.8 - 8.0 K/UL    ABS. LYMPHOCYTES 2.1 0.9 - 3.6 K/UL    ABS. MONOCYTES 1.0 0.05 - 1.2 K/UL    ABS. EOSINOPHILS 0.1 0.0 - 0.4 K/UL    ABS. BASOPHILS 0.0 0.0 - 0.1 K/UL    ABS. IMM. GRANS. 0.1 (H) 0.00 - 0.04 K/UL    DF AUTOMATED     METABOLIC PANEL, COMPREHENSIVE    Collection Time: 12/23/22 12:30 PM   Result Value Ref Range    Sodium 138 136 - 145 mmol/L    Potassium 4.3 3.5 - 5.5 mmol/L    Chloride 107 100 - 111 mmol/L    CO2 24 21 - 32 mmol/L    Anion gap 7 3.0 - 18 mmol/L    Glucose 116 (H) 74 - 99 mg/dL    BUN 15 7.0 - 18 MG/DL    Creatinine 1.10 0.6 - 1.3 MG/DL    BUN/Creatinine ratio 14 12 - 20      eGFR 56 (L) >60 ml/min/1.73m2    Calcium 8.8 8.5 - 10.1 MG/DL    Bilirubin, total 0.6 0.2 - 1.0 MG/DL    ALT (SGPT) 39 13 - 56 U/L    AST (SGOT) 29 10 - 38 U/L    Alk.  phosphatase 96 45 - 117 U/L    Protein, total 6.7 6.4 - 8.2 g/dL    Albumin 3.1 (L) 3.4 - 5.0 g/dL    Globulin 3.6 2.0 - 4.0 g/dL    A-G Ratio 0.9 0.8 - 1.7     TROPONIN-HIGH SENSITIVITY    Collection Time: 12/23/22 12:30 PM   Result Value Ref Range    Troponin-High Sensitivity 5 0 - 54 ng/L   NT-PRO BNP    Collection Time: 12/23/22 12:30 PM   Result Value Ref Range    NT pro-BNP 52 0 - 900 PG/ML   D DIMER    Collection Time: 12/23/22 12:30 PM   Result Value Ref Range    D DIMER 2.15 (H) <0.46 ug/ml(FEU)   PROTHROMBIN TIME + INR    Collection Time: 12/23/22 12:30 PM   Result Value Ref Range    Prothrombin time 13.5 11.5 - 15.2 sec    INR 1.0 0.8 - 1.2     PTT    Collection Time: 12/23/22 12:30 PM   Result Value Ref Range    aPTT 34.0 23.0 - 36.4 SEC   EKG, 12 LEAD, INITIAL    Collection Time: 12/23/22  1:15 PM   Result Value Ref Range    Ventricular Rate 97 BPM    Atrial Rate 97 BPM    P-R Interval 138 ms    QRS Duration 76 ms    Q-T Interval 344 ms    QTC Calculation (Bezet) 436 ms    Calculated P Axis 16 degrees    Calculated R Axis -20 degrees    Calculated T Axis 46 degrees    Diagnosis       Normal sinus rhythm  Normal ECG  When compared with ECG of 03-SEP-2013 12:31,  Vent. rate has increased BY  32 BPM     URINALYSIS W/ RFLX MICROSCOPIC    Collection Time: 12/23/22  2:00 PM   Result Value Ref Range    Color YELLOW      Appearance CLOUDY      Specific gravity 1.016 1.005 - 1.030      pH (UA) 7.5 5.0 - 8.0      Protein 30 (A) NEG mg/dL    Glucose Negative NEG mg/dL    Ketone Negative NEG mg/dL    Bilirubin Negative NEG      Blood SMALL (A) NEG      Urobilinogen 1.0 0.2 - 1.0 EU/dL    Nitrites Negative NEG      Leukocyte Esterase LARGE (A) NEG     URINE MICROSCOPIC ONLY    Collection Time: 12/23/22  2:00 PM   Result Value Ref Range    WBC 36 to 50 0 - 5 /hpf    RBC 0 to 3 0 - 5 /hpf    Epithelial cells FEW 0 - 5 /lpf    Bacteria 4+ (A) NEG /hpf       Radiologic Studies   CTA CHEST W OR W WO CONT   Final Result      Mild upper and lower lobe segmental and subsegmental emboli. RV to LV ratio 0.9. No focal consolidation. Findings discussed with WHITNEY Kahn at 2:22 PM on 12/23/2022. XR CHEST PORT   Final Result      No acute findings in the chest.         CT Results  (Last 48 hours)                 12/23/22 1407  CTA CHEST W OR W WO CONT Final result    Impression:      Mild upper and lower lobe segmental and subsegmental emboli. RV to LV ratio 0.9. No focal consolidation. Findings discussed with WHITNEY Kahn at 2:22 PM on 12/23/2022. Narrative:  EXAM: CTA Chest       INDICATION: Shortness of breath. Possible PE.       COMPARISON: No prior study. TECHNIQUE: Axial CT imaging from the thoracic inlet through the diaphragm with   intravenous contrast utilizing CTA study for pulmonary artery evaluation. Coronal and sagittal MIP reformations were generated at a separate workstation. One or more dose reduction techniques were used on this CT: automated exposure   control, adjustment of the mAs and/or kVp according to patient size, and   iterative reconstruction techniques.   The specific techniques used on this CT   exam have been documented in the patient's electronic medical record. Digital   Imaging and Communications in Medicine (DICOM) format image data are available   to nonaffiliated external healthcare facilities or entities on a secure, media   free, reciprocally searchable basis with patient authorization for at least a   12-month period after this study. _______________       FINDINGS:       EXAM QUALITY: Overall exam quality is adequate. Pulmonary arterial enhancement   is adequate. The breath hold is satisfactory. PULMONARY ARTERIES: Right upper and lower lobe and lingula segmental and   subsegmental emboli. RV to LV ratio 0.9. MEDIASTINUM: Normal heart size. Aorta is unremarkable. No pericardial effusion. LYMPH NODES: No enlarged mediastinal or hilar nodes by size criteria. AIRWAY: Unremarkable. LUNGS: No suspicious nodule or mass. No consolidation. PLEURA: No pleural effusion or pneumothorax. UPPER ABDOMEN: Visualized upper abdomen is unremarkable. .       OTHER: No acute or aggressive osseous abnormalities identified. Bilateral   breast implants.       _______________                 CXR Results  (Last 48 hours)                 12/23/22 1225  XR CHEST PORT Final result    Impression:      No acute findings in the chest.        Narrative:  EXAM: XR CHEST PORT       CLINICAL INDICATION/HISTORY: cough   -Additional: None       COMPARISON: 9/3/2013       TECHNIQUE: Frontal view of the chest       _______________       FINDINGS:       HEART AND MEDIASTINUM: Normal cardiac size and mediastinal contours. LUNGS AND PLEURAL SPACES: No focal pneumonic consolidation, pneumothorax, or   pleural effusion.        BONY THORAX AND SOFT TISSUES: No acute osseous abnormality       _______________                   Medications given in the ED-  Medications   cefTRIAXone (ROCEPHIN) 1 g in 0.9% sodium chloride (MBP/ADV) 50 mL MBP (has no administration in time range)   heparin (porcine) 1,000 unit/mL injection 7,620 Units (has no administration in time range)   heparin 25,000 units in D5W 250 ml infusion (has no administration in time range)   dexamethasone (PF) (DECADRON) 10 mg/mL injection 10 mg (10 mg IntraVENous Given 12/23/22 1313)   sodium chloride 0.9 % bolus infusion 1,000 mL (1,000 mL IntraVENous New Bag 12/23/22 1313)   HYDROcodone-chlorpheniramine (TUSSIONEX) oral suspension 5 mL (5 mL Oral Given 12/23/22 1313)   albuterol-ipratropium (DUO-NEB) 2.5 MG-0.5 MG/3 ML (3 mL Nebulization Given 12/23/22 1313)   iopamidoL (ISOVUE-370) 370 mg iodine /mL (76 %) injection 100 mL (100 mL IntraVENous Given 12/23/22 1353)         Medical Decision Making   I am the first provider for this patient. I reviewed the vital signs, available nursing notes, past medical history, past surgical history, family history and social history. Vital Signs-Reviewed the patient's vital signs. Pulse Oximetry Analysis - 97% on RA. NORMAL    Records Reviewed: Nursing Notes and Old Medical Records    Provider Notes (Medical Decision Making): PE, arrhythmia, ACS/MI, PTX, PNA, COPD, CHF, asthma/RAD, anemia, COVID, influenza    Procedures:  Procedures    ED Course:   12:34 PM Initial assessment performed. The patients presenting problems have been discussed, and they are in agreement with the care plan formulated and outlined with them. I have encouraged them to ask questions as they arise throughout their visit. Diagnosis and Disposition       3:20 PM  Pt feeling better. CTA reveals bilateral PEs with no heart strain. Discussed with attending. IV heparin and admit. 3:42 PM Consult: I discussed care with Prasad García MD (on call medicine). It was a standard discussion, including history of patients chief complaint, available diagnostic results, and treatment course.  Admit to medical.        1. Acute pulmonary embolism without acute cor pulmonale, unspecified pulmonary embolism type ([facility] [address]    2. Acute cystitis without hematuria        PLAN:  1. Admit to medical    3:20 PM  I have spent 37 minutes of critical care time involved in lab review, consultations with specialist, family decision-making, and documentation. During this entire length of time I was immediately available to the patient. This time excludes separately billable procedures. Critical Care: The reason for providing this level of medical care for this critically ill patient was due a critical illness that impaired one or more vital organ systems such that there was a high probability of imminent or life threatening deterioration in the patients condition. This care involved high complexity decision making to assess, manipulate, and support vital system functions, to treat this degreee vital organ system failure and to prevent further life threatening deterioration of the patients condition. Attestations: This note is prepared by Antonieta Salinas PA-C.  _______________________________        Please note that this dictation was completed with [employer], the computer voice recognition software. Quite often unanticipated grammatical, syntax, homophones, and other interpretive errors are inadvertently transcribed by the computer software. Please disregard these errors. Please excuse any errors that have escaped final proofreading.

## 2022-12-23 NOTE — ED TRIAGE NOTES
Pt states \" I was sent here by Dr. Jhaveri Auburn to come her to be checked for a PE or pneumonia because I have been sick for 3 weeks with flu and Covid. \"

## 2022-12-24 ENCOUNTER — APPOINTMENT (OUTPATIENT)
Dept: VASCULAR SURGERY | Age: 67
DRG: 176 | End: 2022-12-24
Attending: HOSPITALIST
Payer: MEDICARE

## 2022-12-24 LAB
ANION GAP SERPL CALC-SCNC: 8 MMOL/L (ref 3–18)
APTT PPP: 112.8 SEC (ref 23–36.4)
APTT PPP: 121.1 SEC (ref 23–36.4)
APTT PPP: 71.4 SEC (ref 23–36.4)
BASOPHILS # BLD: 0 K/UL (ref 0–0.1)
BASOPHILS NFR BLD: 0 % (ref 0–2)
BUN SERPL-MCNC: 17 MG/DL (ref 7–18)
BUN/CREAT SERPL: 17 (ref 12–20)
CALCIUM SERPL-MCNC: 9.4 MG/DL (ref 8.5–10.1)
CHLORIDE SERPL-SCNC: 107 MMOL/L (ref 100–111)
CO2 SERPL-SCNC: 23 MMOL/L (ref 21–32)
CREAT SERPL-MCNC: 1.02 MG/DL (ref 0.6–1.3)
DIFFERENTIAL METHOD BLD: ABNORMAL
EOSINOPHIL # BLD: 0 K/UL (ref 0–0.4)
EOSINOPHIL NFR BLD: 0 % (ref 0–5)
ERYTHROCYTE [DISTWIDTH] IN BLOOD BY AUTOMATED COUNT: 12.9 % (ref 11.6–14.5)
GLUCOSE SERPL-MCNC: 159 MG/DL (ref 74–99)
HCT VFR BLD AUTO: 36.7 % (ref 35–45)
HGB BLD-MCNC: 12.1 G/DL (ref 12–16)
IMM GRANULOCYTES # BLD AUTO: 0.1 K/UL (ref 0–0.04)
IMM GRANULOCYTES NFR BLD AUTO: 1 % (ref 0–0.5)
LYMPHOCYTES # BLD: 1 K/UL (ref 0.9–3.6)
LYMPHOCYTES NFR BLD: 13 % (ref 21–52)
MCH RBC QN AUTO: 29.9 PG (ref 24–34)
MCHC RBC AUTO-ENTMCNC: 33 G/DL (ref 31–37)
MCV RBC AUTO: 90.6 FL (ref 78–100)
MONOCYTES # BLD: 0.2 K/UL (ref 0.05–1.2)
MONOCYTES NFR BLD: 2 % (ref 3–10)
NEUTS SEG # BLD: 6.5 K/UL (ref 1.8–8)
NEUTS SEG NFR BLD: 84 % (ref 40–73)
NRBC # BLD: 0 K/UL (ref 0–0.01)
NRBC BLD-RTO: 0 PER 100 WBC
PLATELET # BLD AUTO: 265 K/UL (ref 135–420)
PMV BLD AUTO: 9.4 FL (ref 9.2–11.8)
POTASSIUM SERPL-SCNC: 4.5 MMOL/L (ref 3.5–5.5)
RBC # BLD AUTO: 4.05 M/UL (ref 4.2–5.3)
SODIUM SERPL-SCNC: 138 MMOL/L (ref 136–145)
WBC # BLD AUTO: 7.7 K/UL (ref 4.6–13.2)

## 2022-12-24 PROCEDURE — 65270000029 HC RM PRIVATE

## 2022-12-24 PROCEDURE — 85025 COMPLETE CBC W/AUTO DIFF WBC: CPT

## 2022-12-24 PROCEDURE — 93970 EXTREMITY STUDY: CPT

## 2022-12-24 PROCEDURE — 74011250637 HC RX REV CODE- 250/637: Performed by: FAMILY MEDICINE

## 2022-12-24 PROCEDURE — 74011250637 HC RX REV CODE- 250/637: Performed by: INTERNAL MEDICINE

## 2022-12-24 PROCEDURE — 85730 THROMBOPLASTIN TIME PARTIAL: CPT

## 2022-12-24 PROCEDURE — 74011250636 HC RX REV CODE- 250/636: Performed by: PHYSICIAN ASSISTANT

## 2022-12-24 PROCEDURE — 74011250636 HC RX REV CODE- 250/636: Performed by: HOSPITALIST

## 2022-12-24 PROCEDURE — 74011000258 HC RX REV CODE- 258: Performed by: HOSPITALIST

## 2022-12-24 PROCEDURE — 80048 BASIC METABOLIC PNL TOTAL CA: CPT

## 2022-12-24 PROCEDURE — 74011250637 HC RX REV CODE- 250/637: Performed by: HOSPITALIST

## 2022-12-24 PROCEDURE — 36415 COLL VENOUS BLD VENIPUNCTURE: CPT

## 2022-12-24 RX ORDER — ACETAMINOPHEN 325 MG/1
650 TABLET ORAL
Status: DISCONTINUED | OUTPATIENT
Start: 2022-12-24 | End: 2022-12-25 | Stop reason: HOSPADM

## 2022-12-24 RX ORDER — HEPARIN SODIUM 1000 [USP'U]/ML
40 INJECTION, SOLUTION INTRAVENOUS; SUBCUTANEOUS ONCE
Status: COMPLETED | OUTPATIENT
Start: 2022-12-24 | End: 2022-12-24

## 2022-12-24 RX ORDER — GUAIFENESIN 100 MG/5ML
100 SOLUTION ORAL
Status: DISCONTINUED | OUTPATIENT
Start: 2022-12-24 | End: 2022-12-25 | Stop reason: HOSPADM

## 2022-12-24 RX ADMIN — BUPROPION HYDROCHLORIDE 75 MG: 75 TABLET, FILM COATED ORAL at 10:13

## 2022-12-24 RX ADMIN — GUAIFENESIN 100 MG: 200 SOLUTION ORAL at 21:42

## 2022-12-24 RX ADMIN — ACETAMINOPHEN 650 MG: 325 TABLET ORAL at 21:44

## 2022-12-24 RX ADMIN — HEPARIN SODIUM 17 UNITS/KG/HR: 10000 INJECTION, SOLUTION INTRAVENOUS at 10:05

## 2022-12-24 RX ADMIN — HEPARIN SODIUM 15 UNITS/KG/HR: 10000 INJECTION, SOLUTION INTRAVENOUS at 18:16

## 2022-12-24 RX ADMIN — HEPARIN SODIUM 3810 UNITS: 1000 INJECTION INTRAVENOUS; SUBCUTANEOUS at 10:07

## 2022-12-24 RX ADMIN — CEFTRIAXONE 1 G: 1 INJECTION, POWDER, FOR SOLUTION INTRAMUSCULAR; INTRAVENOUS at 14:43

## 2022-12-24 RX ADMIN — LEVOTHYROXINE SODIUM 150 MCG: 0.07 TABLET ORAL at 07:07

## 2022-12-24 RX ADMIN — Medication 2000 UNITS: at 10:13

## 2022-12-24 NOTE — PROGRESS NOTES
Problem: Pulmonary Embolism Care Plan (Adult)  Goal: *Improvement of existing pulmonary embolism  Outcome: Progressing Towards Goal  Problem: Pulmonary Embolism Care Plan (Adult)  Goal: *Labs within defined limits  Outcome: Not Progressing Towards Goal  Following outlined protocol in [facility]

## 2022-12-24 NOTE — H&P
History & Physical    Patient: Tianna Jeff MRN: [id number]  CSN: [id number]    YOB: 1955  Age: 74 y.o. Sex: female      DOA: 2022  Primary Care Provider:  Xavi oMe DO      Assessment/Plan     Patient Active Problem List   Diagnosis Code    Left knee DJD M17.12    Dysphagia R13.10    History of colon polyps Z86.010    Family history of colon cancer Z80.0    Arthritis M19.90    GERD (gastroesophageal reflux disease) K21.9    Family history of colon cancer in father Z80.0    Rectocele N81.6    UTI (urinary tract infection) N39.0    Acute pulmonary embolism ([address] I26.99    Acquired hypothyroidism E03.9       Admit to monitored floor    PE -  Check LE duplex  Started on heparin drip  Check echo    UTI -  Started on ceftriaxone  Follow urine cultures. Hypothyroidism -  Continue synthroid  Check TSH    Estimated length of stay : 2-3 days    CC: SOB       HPI:     Tianna Jeff is a 74 y.o. female with hypothyroidism, GERD, arthritis presents to ER with concerns of SOB. Patient reports that she went to [facility] earlier this month, she had covid 19 and influenza. She has been having cough and congestion for the past 3 weeks. Also reported that she is getting SOB on climbing stairs. She received steroids. She was seen by her PCP today and was sent to ER. She denies any chest pain, fever/chills, N/V, leg swelling. In ER CTA chest showed PE, high sensitivity troponin in normal range, NTpro BNP in normal range. Her UA with evidence of UTI.     Past Medical History:   Diagnosis Date    Arthritis     Family history of colon cancer in father     GERD (gastroesophageal reflux disease)     Hiatal hernia     Left knee DJD 2013    Menopause     age 48    Rectocele      (spontaneous vaginal delivery)      (spontaneous vaginal delivery) 1985    Thyroid disease     Hypothyroid       Past Surgical History:   Procedure Laterality Date    COLONOSCOPY N/A 2021    COLONOSCOPY performed by Jacky Cooper MD at THE [facility] ENDOSCOPY    [address]    left dislocation of shoulder and pinning    HX ORTHOPAEDIC  2010    right total knee replacement    HX ORTHOPAEDIC  2013    left total knee replacement    HX OTHER SURGICAL  2004    tummy tuck        HX OTHER SURGICAL  2004    breast lift       Family History   Problem Relation Age of Onset    Post-op Nausea/Vomiting Mother     OSTEOARTHRITIS Mother     Colon Cancer Father     OSTEOARTHRITIS Brother     GERD Brother        Social History     Socioeconomic History    Marital status:    Tobacco Use    Smoking status: Never    Smokeless tobacco: Never   Vaping Use    Vaping Use: Never used   Substance and Sexual Activity    Alcohol use: Yes     Comment: rare 3-6 glasses yearly    Drug use: No    Sexual activity: Yes     Partners: Male     Birth control/protection: None     Comment: not assessed       Prior to Admission medications    Medication Sig Start Date End Date Taking? Authorizing Provider   cholecalciferol (VITAMIN D3) (1000 Units /25 mcg) tablet Take 2 Tabs by mouth. Yes Provider, Historical   levothyroxine (SYNTHROID) 150 mcg tablet Take 150 mcg by mouth Daily (before breakfast). Yes Provider, Historical   buPROPion (WELLBUTRIN) 75 mg tablet Take 75 mg by mouth daily. Yes Provider, Historical   pantoprazole (PROTONIX) 40 mg tablet Take 40 mg by mouth as needed. Yes Provider, Historical       Allergies   Allergen Reactions    Erythromycin Nausea and Vomiting       Review of Systems  Gen: No fever, chills, malaise, weight loss/gain. Heent: No headache, rhinorrhea, epistaxis, ear pain, hearing loss, sinus pain, neck pain/stiffness, sore throat. Heart: No chest pain, palpitations, LALA, pnd, or orthopnea. Resp: see above   GI: No nausea, vomiting, diarrhea, constipation, melena or hematochezia. : No urinary obstruction, dysuria or hematuria. Derm: No rash, new skin lesion or pruritis.    Musc/skeletal: no bone or joint complains. Vasc: No edema, cyanosis or claudication. Endo: No heat/cold intolerance, no polyuria,polydipsia or polyphagia. Neuro: No unilateral weakness, numbness, tingling. No seizures. Heme: No easy bruising or bleeding. Physical Exam:     Physical Exam:  Visit Vitals  /77   Pulse 100   Temp 97.3 °F (36.3 °C)   Resp 22   Ht 5' 7\" (1.702 m)   Wt 95.3 kg (210 lb)   SpO2 94%   BMI 32.89 kg/m²      O2 Device: None (Room air)    Temp (24hrs), Av.3 °F (36.3 °C), Min:97.3 °F (36.3 °C), Max:97.3 °F (36.3 °C)    1901 -  0700  In: 5049 [I.V.:1050]  Out: -    No intake/output data recorded. General:  Awake, cooperative, no distress. Head:  Normocephalic, without obvious abnormality, atraumatic. Eyes:  Conjunctivae/corneas clear, sclera anicteric, PERRL, EOMs intact. Nose: Nares normal. No drainage or sinus tenderness. Throat: Lips, mucosa, and tongue normal.    Neck: Supple, symmetrical, trachea midline, no adenopathy. Lungs:   Clear to auscultation bilaterally. Heart:   S1, S2, no murmur, click, rub or gallop. Abdomen: Soft, non-tender. Bowel sounds normal. No masses,  No organomegaly. Extremities: Extremities normal, atraumatic, no cyanosis or edema. Capillary refill normal.   Pulses: 2+ and symmetric all extremities. Skin: Skin color pink, turgor normal. No rashes or lesions   Neurologic: CNII-XII intact. No focal motor or sensory deficit.        Labs Reviewed:    CMP:   Lab Results   Component Value Date/Time     2022 12:30 PM    K 4.3 2022 12:30 PM     2022 12:30 PM    CO2 24 2022 12:30 PM    AGAP 7 2022 12:30 PM     (H) 2022 12:30 PM    BUN 15 2022 12:30 PM    CREA 1.10 2022 12:30 PM    CA 8.8 2022 12:30 PM    ALB 3.1 (L) 2022 12:30 PM    TP 6.7 2022 12:30 PM    GLOB 3.6 2022 12:30 PM    AGRAT 0.9 2022 12:30 PM    ALT 39 2022 12:30 PM     CBC: Lab Results   Component Value Date/Time    WBC 10.7 12/23/2022 12:30 PM    HGB 12.9 12/23/2022 12:30 PM    HCT 38.3 12/23/2022 12:30 PM     12/23/2022 12:30 PM         Procedures/imaging: see electronic medical records for all procedures/Xrays and details which were not copied into this note but were reviewed prior to creation of Plan    Please note that this dictation was completed with [employer], the [employer] voice recognition software. Quite often unanticipated grammatical, syntax, homophones, and other interpretive errors are inadvertently transcribed by the computer software. Please disregard these errors. Please excuse any errors that have escaped final proofreading.         CC: Tyrone Torres, DO

## 2022-12-24 NOTE — PROGRESS NOTES
Pt arrived on unit on stretcher and ambulated to bed. Pt in no apparent distress. No thoughts of self harm. PIv in R AC and R hand noted. Heparin drip running upon admission and verified with AM RN. Skin intact. Cough present. Patient placed on unit tele monitor 45 at Normal Sinus. 2325- Heparin drip stopped via IV. RN will resume heparin per protocol.     0300- Pt awake, resting comfortably, no sign of respiratory distress

## 2022-12-24 NOTE — ED NOTES
TRANSFER - OUT REPORT:    Verbal report given to [facility] RN(name) on Karolyn Cagle  being transferred to Med/Surg(unit) for routine progression of care       Report consisted of patients Situation, Background, Assessment and   Recommendations(SBAR). Information from the following report(s) SBAR, ED Summary and MAR was reviewed with the receiving nurse. Lines:   Peripheral IV 12/23/22 Right Antecubital (Active)        Opportunity for questions and clarification was provided.       Patient transported with:   Monitor  Registered Nurse

## 2022-12-24 NOTE — PROGRESS NOTES
Reason for Admission:  SOB                     RUR Score:    Low; 5%                 Plan for utilizing home health:     Unlikely      PCP: First and Last name:  Chapis Estrada DO     Name of Practice:    Are you a current patient: Yes/No:    Approximate date of last visit:    Can you participate in a virtual visit with your PCP:                     Current Advanced Directive/Advance Care Plan: Full Code      Healthcare Decision Maker:   Click here to complete [address] including selection of the Healthcare Decision Maker Relationship (ie \"Primary\")                             Transition of Care Plan:     Home with physician follow up                  Chart reviewed. Per H&P Madelin Fredrick is a 74 y.o. female with hypothyroidism, GERD, arthritis presents to ER with concerns of SOB. Patient reports that she went to [facility] earlier this month, she had covid 19 and influenza. She has been having cough and congestion for the past 3 weeks. Also reported that she is getting SOB on climbing stairs. She received steroids. She was seen by her PCP today and was sent to ER. She denies any chest pain, fever/chills, N/V, leg swelling. In ER CTA chest showed PE, high sensitivity troponin in normal range, NTpro BNP in normal range. Her UA with evidence of UTI. \"    CM spoke with pt to discuss care transition. Pt is  and independent. Pt has access to a cane and a RW if needed. Pt has confirmed her PCP in the community. Anticipate pt will transition home with physician follow up with in the next 24-48 hours. No other care transition needs have been identified at this time. CM to continue to follow and assist as needed. Care Management Interventions  Mode of Transport at Discharge:  Other (see comment) (Family)  Transition of Care Consult (CM Consult): Discharge Planning  Health Maintenance Reviewed: Yes  Support Systems: Spouse/Significant Other  Confirm Follow Up Transport: Self  The Plan for Transition of Care is Related to the Following Treatment Goals : Home with physician follow up  Discharge Location  Patient Expects to be Discharged to[de-identified] Home with family assistance

## 2022-12-24 NOTE — PROGRESS NOTES
Problem: Falls - Risk of  Goal: *Absence of Falls  Description: Document Mary Fothergill Fall Risk and appropriate interventions in the flowsheet.   Outcome: Progressing Towards Goal  Note: Fall Risk Interventions:                                Problem: Patient Education: Go to Patient Education Activity  Goal: Patient/Family Education  Outcome: Progressing Towards Goal

## 2022-12-24 NOTE — PROGRESS NOTES
Hospitalist Progress Note    Patient: Kathia House MRN: [id number]  CSN: [id number]    YOB: 1955  Age: 74 y.o. Sex: female    DOA: 12/23/2022 LOS:  LOS: 1 day                Assessment/Plan     Patient Active Problem List   Diagnosis Code    Left knee DJD M17.12    Dysphagia R13.10    History of colon polyps Z86.010    Family history of colon cancer Z80.0    Arthritis M19.90    GERD (gastroesophageal reflux disease) K21.9    Family history of colon cancer in father Z80.0    Rectocele N81.6    UTI (urinary tract infection) N39.0    Acute pulmonary embolism ([address] I26.99    Acquired hypothyroidism E03.9        Chief complaint :  SOB   74 y.o. female with hypothyroidism, GERD, arthritis presents to ER with concerns of SOB. PE/DVT -  continue on heparin drip, switch to NOAC  Check echo     UTI -  Started on ceftriaxone  Follow urine cultures. Hypothyroidism -  Continue synthroid  TSH in normal range. Disposition : 1-2 days    Review of systems  General: No fevers or chills. Cardiovascular: No chest pain or pressure. No palpitations. Pulmonary: No shortness of breath. Gastrointestinal: No nausea, vomiting. Physical Exam:  General: Awake, cooperative, no acute distress    HEENT: NC, Atraumatic. PERRLA, anicteric sclerae. Lungs: CTA Bilaterally. No Wheezing/Rhonchi/Rales. Heart:  S1 S2,  No murmur, No Rubs, No Gallops  Abdomen: Soft, Non distended, Non tender. +Bowel sounds,   Extremities: No c/c/e  Psych:   Not anxious or agitated. Neurologic:  No acute neurological deficit. Vital signs/Intake and Output:  Visit Vitals  /83   Pulse 82   Temp 97.5 °F (36.4 °C)   Resp 12   Ht 5' 7\" (1.702 m)   Wt 95.3 kg (210 lb)   SpO2 96%   Breastfeeding No   BMI 32.89 kg/m²     Current Shift:  No intake/output data recorded.   Last three shifts:  12/22 1901 - 12/24 0700  In: 1050 [I.V.:1050]  Out: -             Labs: Results:       Chemistry Recent Labs     12/24/22  6479 12/23/22  1230   * 116*    138   K 4.5 4.3    107   CO2 23 24   BUN 17 15   CREA 1.02 1.10   CA 9.4 8.8   AGAP 8 7   BUCR 17 14   AP  --  96   TP  --  6.7   ALB  --  3.1*   GLOB  --  3.6   AGRAT  --  0.9      CBC w/Diff Recent Labs     12/24/22  0305 12/23/22 2117 12/23/22  1230   WBC 7.7 8.2 10.7   RBC 4.05* 4.12* 4.33   HGB 12.1 12.3 12.9   HCT 36.7 36.6 38.3    274 291   GRANS 84* 88* 70   LYMPH 13* 9* 20*   EOS 0 0 1      Cardiac Enzymes No results for input(s): CPK, CKND1, CRISELDA in the last 72 hours. No lab exists for component: CKRMB, TROIP   Coagulation Recent Labs     12/24/22  0919 12/24/22 0305 12/23/22 2117 12/23/22  1230   PTP  --   --   --  13.5   INR  --   --   --  1.0   APTT 71.4* 112.8*   < > 34.0    < > = values in this interval not displayed. Lipid Panel Lab Results   Component Value Date/Time    Cholesterol, total 201 (H) 10/15/2019 12:07 PM    HDL Cholesterol 59 10/15/2019 12:07 PM    LDL, calculated 125.6 (H) 10/15/2019 12:07 PM    VLDL, calculated 16.4 10/15/2019 12:07 PM    Triglyceride 82 10/15/2019 12:07 PM    CHOL/HDL Ratio 3.4 10/15/2019 12:07 PM      BNP No results for input(s): BNPP in the last 72 hours.    Liver Enzymes Recent Labs     12/23/22  1230   TP 6.7   ALB 3.1*   AP 96      Thyroid Studies Lab Results   Component Value Date/Time    TSH 0.43 12/23/2022 09:17 PM        Procedures/imaging: see electronic medical records for all procedures/Xrays and details which were not copied into this note but were reviewed prior to creation of Plan

## 2022-12-24 NOTE — PROGRESS NOTES
9628  Bedside shift change report given to [address] [place] [address] (oncoming nurse) by [place] (offgoing nurse). Report included the following information SBAR, Kardex, Intake/Output, and MAR.     0750  Attempted to contact lab multiple times. 0901  Attempted to contact lab. No answer. 6364  PTT drawn by Ava Godinez RN after Alva MONGE attempted twice. 1136  Paged on call vascular for duplex. Paged on call echo for echo. 1145  Vascular returned call--coming to see patient. [phone]  Notified by vascular that patient has a clot in R gastroc veins. 36  Notified Dr. Judith Herron of above. 1300  Paged echo. 1340  Reached out to supervisor regarding needed echo. 1630  Spoke to Rusty from echo. States they can do first thing in morning. Dr. Judith Herron aware and okay with timing. 0500  Dr. Judith Herron: continue cardiac monitoring. 1930  Attempted to ambulate patient in hallway. Patient having a coughing spell after ambulating to bathroom. Will attempt later. Dr. De Guzman Danger placing orders for cough medicine. 2005  Bedside and verbal shift change report given to [place] by Laurann Severe, MARIPOSA. Report included SBAR, kardex, MAR, and recent results.

## 2022-12-25 ENCOUNTER — APPOINTMENT (OUTPATIENT)
Dept: NON INVASIVE DIAGNOSTICS | Age: 67
DRG: 176 | End: 2022-12-25
Attending: HOSPITALIST
Payer: MEDICARE

## 2022-12-25 VITALS
SYSTOLIC BLOOD PRESSURE: 110 MMHG | DIASTOLIC BLOOD PRESSURE: 68 MMHG | WEIGHT: 210 LBS | OXYGEN SATURATION: 98 % | HEIGHT: 67 IN | TEMPERATURE: 97.8 F | RESPIRATION RATE: 16 BRPM | HEART RATE: 74 BPM | BODY MASS INDEX: 32.96 KG/M2

## 2022-12-25 PROBLEM — I82.409 DVT (DEEP VENOUS THROMBOSIS) (HCC): Status: ACTIVE | Noted: 2022-12-25

## 2022-12-25 LAB
ANION GAP SERPL CALC-SCNC: 5 MMOL/L (ref 3–18)
APTT PPP: 94.8 SEC (ref 23–36.4)
ATRIAL RATE: 97 BPM
BASOPHILS # BLD: 0 K/UL (ref 0–0.1)
BASOPHILS NFR BLD: 0 % (ref 0–2)
BUN SERPL-MCNC: 23 MG/DL (ref 7–18)
BUN/CREAT SERPL: 21 (ref 12–20)
CALCIUM SERPL-MCNC: 9 MG/DL (ref 8.5–10.1)
CALCULATED P AXIS, ECG09: 16 DEGREES
CALCULATED R AXIS, ECG10: -20 DEGREES
CALCULATED T AXIS, ECG11: 46 DEGREES
CHLORIDE SERPL-SCNC: 108 MMOL/L (ref 100–111)
CO2 SERPL-SCNC: 26 MMOL/L (ref 21–32)
CREAT SERPL-MCNC: 1.07 MG/DL (ref 0.6–1.3)
DIAGNOSIS, 93000: NORMAL
DIFFERENTIAL METHOD BLD: ABNORMAL
ECHO EST RA PRESSURE: 3 MMHG
ECHO LA DIAMETER INDEX: 2.23 CM/M2
ECHO LA DIAMETER: 4.6 CM
ECHO LA VOL 2C: 40 ML (ref 22–52)
ECHO LA VOL 4C: 32 ML (ref 22–52)
ECHO LA VOLUME AREA LENGTH: 33 ML
ECHO LA VOLUME INDEX A2C: 19 ML/M2 (ref 16–34)
ECHO LA VOLUME INDEX A4C: 16 ML/M2 (ref 16–34)
ECHO LA VOLUME INDEX AREA LENGTH: 16 ML/M2 (ref 16–34)
ECHO LV E' LATERAL VELOCITY: 6 CM/S
ECHO LV E' SEPTAL VELOCITY: 5 CM/S
ECHO LV EDV A4C: 73 ML
ECHO LV EDV INDEX A4C: 35 ML/M2
ECHO LV EJECTION FRACTION A4C: 77 %
ECHO LV EJECTION FRACTION BIPLANE: 54 % (ref 55–100)
ECHO LV ESV A2C: 41 ML
ECHO LV ESV A4C: 17 ML
ECHO LV ESV BP: 22 ML (ref 19–49)
ECHO LV ESV INDEX A2C: 20 ML/M2
ECHO LV ESV INDEX A4C: 8 ML/M2
ECHO LV ESV INDEX BP: 11 ML/M2
ECHO LV FRACTIONAL SHORTENING: 26 % (ref 28–44)
ECHO LV INTERNAL DIMENSION DIASTOLE INDEX: 2.23 CM/M2
ECHO LV INTERNAL DIMENSION DIASTOLIC: 4.6 CM (ref 3.9–5.3)
ECHO LV INTERNAL DIMENSION SYSTOLIC INDEX: 1.65 CM/M2
ECHO LV INTERNAL DIMENSION SYSTOLIC: 3.4 CM
ECHO LV IVSD: 0.8 CM (ref 0.6–0.9)
ECHO LV MASS 2D: 117.9 G (ref 67–162)
ECHO LV MASS INDEX 2D: 57.2 G/M2 (ref 43–95)
ECHO LV POSTERIOR WALL DIASTOLIC: 0.8 CM (ref 0.6–0.9)
ECHO LV RELATIVE WALL THICKNESS RATIO: 0.35
ECHO MV A VELOCITY: 0.59 M/S
ECHO MV E VELOCITY: 0.57 M/S
ECHO MV E/A RATIO: 0.97
ECHO MV E/E' LATERAL: 9.5
ECHO MV E/E' RATIO (AVERAGED): 10.45
ECHO MV E/E' SEPTAL: 11.4
ECHO RIGHT VENTRICULAR SYSTOLIC PRESSURE (RVSP): 19 MMHG
ECHO RV INTERNAL DIMENSION: 3.6 CM
ECHO RV TAPSE: 1.9 CM (ref 1.7–?)
ECHO TV REGURGITANT MAX VELOCITY: 2 M/S
ECHO TV REGURGITANT PEAK GRADIENT: 16 MMHG
EOSINOPHIL # BLD: 0 K/UL (ref 0–0.4)
EOSINOPHIL NFR BLD: 0 % (ref 0–5)
ERYTHROCYTE [DISTWIDTH] IN BLOOD BY AUTOMATED COUNT: 13.1 % (ref 11.6–14.5)
GLUCOSE SERPL-MCNC: 126 MG/DL (ref 74–99)
HCT VFR BLD AUTO: 34.3 % (ref 35–45)
HGB BLD-MCNC: 11.3 G/DL (ref 12–16)
IMM GRANULOCYTES # BLD AUTO: 0 K/UL (ref 0–0.04)
IMM GRANULOCYTES NFR BLD AUTO: 0 % (ref 0–0.5)
LYMPHOCYTES # BLD: 2.5 K/UL (ref 0.9–3.6)
LYMPHOCYTES NFR BLD: 21 % (ref 21–52)
MCH RBC QN AUTO: 29.9 PG (ref 24–34)
MCHC RBC AUTO-ENTMCNC: 32.9 G/DL (ref 31–37)
MCV RBC AUTO: 90.7 FL (ref 78–100)
MONOCYTES # BLD: 0.7 K/UL (ref 0.05–1.2)
MONOCYTES NFR BLD: 5 % (ref 3–10)
NEUTS SEG # BLD: 8.8 K/UL (ref 1.8–8)
NEUTS SEG NFR BLD: 73 % (ref 40–73)
NRBC # BLD: 0 K/UL (ref 0–0.01)
NRBC BLD-RTO: 0 PER 100 WBC
P-R INTERVAL, ECG05: 138 MS
PLATELET # BLD AUTO: 244 K/UL (ref 135–420)
PMV BLD AUTO: 9.3 FL (ref 9.2–11.8)
POTASSIUM SERPL-SCNC: 4.1 MMOL/L (ref 3.5–5.5)
Q-T INTERVAL, ECG07: 344 MS
QRS DURATION, ECG06: 76 MS
QTC CALCULATION (BEZET), ECG08: 436 MS
RBC # BLD AUTO: 3.78 M/UL (ref 4.2–5.3)
SODIUM SERPL-SCNC: 139 MMOL/L (ref 136–145)
VENTRICULAR RATE, ECG03: 97 BPM
WBC # BLD AUTO: 12.1 K/UL (ref 4.6–13.2)

## 2022-12-25 PROCEDURE — 36415 COLL VENOUS BLD VENIPUNCTURE: CPT

## 2022-12-25 PROCEDURE — 74011250637 HC RX REV CODE- 250/637: Performed by: HOSPITALIST

## 2022-12-25 PROCEDURE — 85730 THROMBOPLASTIN TIME PARTIAL: CPT

## 2022-12-25 PROCEDURE — 93306 TTE W/DOPPLER COMPLETE: CPT

## 2022-12-25 PROCEDURE — 80048 BASIC METABOLIC PNL TOTAL CA: CPT

## 2022-12-25 PROCEDURE — 74011250636 HC RX REV CODE- 250/636: Performed by: HOSPITALIST

## 2022-12-25 PROCEDURE — 74011250636 HC RX REV CODE- 250/636: Performed by: PHYSICIAN ASSISTANT

## 2022-12-25 PROCEDURE — 85025 COMPLETE CBC W/AUTO DIFF WBC: CPT

## 2022-12-25 PROCEDURE — 74011000258 HC RX REV CODE- 258: Performed by: HOSPITALIST

## 2022-12-25 RX ORDER — NITROFURANTOIN 25; 75 MG/1; MG/1
100 CAPSULE ORAL 2 TIMES DAILY
Qty: 6 CAPSULE | Refills: 0 | Status: SHIPPED | OUTPATIENT
Start: 2022-12-25 | End: 2022-12-28

## 2022-12-25 RX ORDER — GUAIFENESIN 600 MG/1
600 TABLET, EXTENDED RELEASE ORAL 2 TIMES DAILY
Qty: 20 TABLET | Refills: 0 | Status: SHIPPED | OUTPATIENT
Start: 2022-12-25 | End: 2023-01-04

## 2022-12-25 RX ADMIN — BUPROPION HYDROCHLORIDE 75 MG: 75 TABLET, FILM COATED ORAL at 08:53

## 2022-12-25 RX ADMIN — Medication 2000 UNITS: at 08:53

## 2022-12-25 RX ADMIN — APIXABAN 10 MG: 5 TABLET, FILM COATED ORAL at 10:43

## 2022-12-25 RX ADMIN — CEFTRIAXONE 1 G: 1 INJECTION, POWDER, FOR SOLUTION INTRAMUSCULAR; INTRAVENOUS at 10:43

## 2022-12-25 RX ADMIN — HEPARIN SODIUM 15 UNITS/KG/HR: 10000 INJECTION, SOLUTION INTRAVENOUS at 09:07

## 2022-12-25 RX ADMIN — HEPARIN SODIUM 15 UNITS/KG/HR: 10000 INJECTION, SOLUTION INTRAVENOUS at 07:24

## 2022-12-25 RX ADMIN — LEVOTHYROXINE SODIUM 150 MCG: 0.07 TABLET ORAL at 06:23

## 2022-12-25 NOTE — PROGRESS NOTES
Pt received from [address].  at bedside assisting pt with clothing. Pt profusely coughing. Lungs sounds clear bilaterally; Diminished in lower lobe. No wheezing. Pt given tylenol for headache and pain from coughing. Heparin verified. 2140- Pt ambulated in hallway ad rik, with no difficulty and returned to bed.     0630- Pt requested that with updates on the aPTT, that she would like to hold her drip and start her eliquis and requested her ABX time be changed. Sanya peres.

## 2022-12-25 NOTE — PROGRESS NOTES
Problem: Falls - Risk of  Goal: *Absence of Falls  Description: Document Naa Rueda Fall Risk and appropriate interventions in the flowsheet.   Outcome: Progressing Towards Goal  Note: Fall Risk Interventions:            Medication Interventions: Teach patient to arise slowly                   Problem: Patient Education: Go to Patient Education Activity  Goal: Patient/Family Education  Outcome: Progressing Towards Goal     Problem: Pulmonary Embolism Care Plan (Adult)  Goal: *Improvement of existing pulmonary embolism  Outcome: Progressing Towards Goal  Goal: *Absence of bleeding  Outcome: Progressing Towards Goal  Goal: *Labs within defined limits  Outcome: Progressing Towards Goal     Problem: Patient Education: Go to Patient Education Activity  Goal: Patient/Family Education  Outcome: Progressing Towards Goal

## 2022-12-25 NOTE — DISCHARGE SUMMARY
Discharge Summary    Patient: Von Lance MRN: [id number]  CSN: [id number]    YOB: 1955  Age: 74 y.o.   Sex: female    DOA: 12/23/2022 LOS:  LOS: 2 days   Discharge Date:      Primary Care Provider:  Irena Casillas DO    Admission Diagnoses: Acute pulmonary embolism ([facility] [I26.99]  UTI (urinary tract infection) [N39.0]  Asthma exacerbation [J45.901]    Discharge Diagnoses:    Problem List as of 12/25/2022 Date Reviewed: 4/8/2020            Codes Class Noted - Resolved    DVT (deep venous thrombosis) ([facility] ICD-10-CM: I82.409  ICD-9-CM: 453.40  12/25/2022 - Present        UTI (urinary tract infection) ICD-10-CM: N39.0  ICD-9-CM: 599.0  12/23/2022 - Present        * (Principal) Acute pulmonary embolism ([facility] ICD-10-CM: I26.99  ICD-9-CM: 415.19  12/23/2022 - Present        Acquired hypothyroidism ICD-10-CM: E03.9  ICD-9-CM: 244.9  12/23/2022 - Present        Rectocele ICD-10-CM: N81.6  ICD-9-CM: 618.04  Unknown - Present        Arthritis ICD-10-CM: M19.90  ICD-9-CM: 716.90  Unknown - Present        GERD (gastroesophageal reflux disease) ICD-10-CM: K21.9  ICD-9-CM: 530.81  Unknown - Present        Family history of colon cancer in father ICD-10-CM: Z80.0  ICD-9-CM: V16.0  Unknown - Present        Dysphagia ICD-10-CM: R13.10  ICD-9-CM: 787.20  1/9/2015 - Present        History of colon polyps ICD-10-CM: Z86.010  ICD-9-CM: V12.72  1/9/2015 - Present        Family history of colon cancer ICD-10-CM: Z80.0  ICD-9-CM: V16.0  1/9/2015 - Present        Left knee DJD (Chronic) ICD-10-CM: M17.12  ICD-9-CM: 715.96  9/17/2013 - Present           Discharge Medications:     Current Discharge Medication List        START taking these medications    Details   apixaban (ELIQUIS) 5 mg (74 tabs) starter pack Take 10 mg (two 5 mg tablets) by mouth twice a day for 7 days Followed by 5 mg (one 5 mg tablet) by mouth twice a day  Qty: 1 Dose Pack, Refills: 0  Start date: 12/25/2022      nitrofurantoin, macrocrystal-monohydrate, (Macrobid) 100 mg capsule Take 1 Capsule by mouth two (2) times a day for 3 days. Qty: 6 Capsule, Refills: 0  Start date: 12/25/2022, End date: 12/28/2022      guaiFENesin ER (Mucinex) 600 mg ER tablet Take 1 Tablet by mouth two (2) times a day for 10 days. Qty: 20 Tablet, Refills: 0  Start date: 12/25/2022, End date: 1/4/2023           CONTINUE these medications which have NOT CHANGED    Details   polycarbophil calcium (EQUALACTIN) 500 mg chew Take 1,000 mg by mouth daily. To prevent constipation      acetaminophen (TYLENOL) 325 mg tablet Take 650 mg by mouth every four (4) hours as needed for Pain. promethazine (PHENERGAN) 6.25 mg/5 mL syrup Take 6.25 mg by mouth four (4) times daily as needed (Cough Syrup). albuterol (PROVENTIL HFA, VENTOLIN HFA, PROAIR HFA) 90 mcg/actuation inhaler Take 2 Puffs by inhalation every six (6) hours as needed for Wheezing. Indications: bronchospasm prevention      cholecalciferol (VITAMIN D3) (1000 Units /25 mcg) tablet Take 2 Tabs by mouth.      levothyroxine (SYNTHROID) 150 mcg tablet Take 150 mcg by mouth Daily (before breakfast). buPROPion (WELLBUTRIN) 75 mg tablet Take 75 mg by mouth daily. pantoprazole (PROTONIX) 40 mg tablet Take 40 mg by mouth as needed. Discharge Condition: Good    Procedures : None    Consults: None      PHYSICAL EXAM   Visit Vitals  /68   Pulse 74   Temp 97.8 °F (36.6 °C)   Resp 16   Ht 5' 7\" (1.702 m)   Wt 95.3 kg (210 lb)   SpO2 98%   Breastfeeding No   BMI 32.89 kg/m²     General: Awake, cooperative, no acute distress    HEENT: NC, Atraumatic. PERRLA, EOMI. Anicteric sclerae. Lungs:  CTA Bilaterally. No Wheezing/Rhonchi/Rales. Heart:  Regular  rhythm,  No murmur, No Rubs, No Gallops  Abdomen: Soft, Non distended, Non tender. +Bowel sounds,   Extremities: No c/c/e  Psych:   Not anxious or agitated. Neurologic:  No acute neurological deficits. Admission HPI :   Michael Méndez is a 74 y.o. female with hypothyroidism, GERD, arthritis presents to ER with concerns of SOB. Patient reports that she went to [facility] earlier this month, she had covid 19 and influenza. She has been having cough and congestion for the past 3 weeks. Also reported that she is getting SOB on climbing stairs. She received steroids. She was seen by her PCP today and was sent to ER. She denies any chest pain, fever/chills, N/V, leg swelling. In ER CTA chest showed PE, high sensitivity troponin in normal range, NTpro BNP in normal range. Her UA with evidence of UTI. Hospital Course :   Ms. Arie Quan was admitted to monitored floor. PE/DVT -  Started on heparin drip, switched to eliquis  Echo with normal LVF, EF of 55-60%, right ventricle low normal systolic function. She is off oxygen, she is ambulating with no SOB or chest pain. UTI -  Started on ceftriaxone  urine cultures growing GNR. Received 3 days of ceftriaxone, will discharge on macrobid. Hypothyroidism -  Continued synthroid  TSH in normal range. Activity: Activity as tolerated    Diet: Regular Diet    Follow-up: PCP    Disposition: home    Minutes spent on discharge: 45       Labs: Results:       Chemistry Recent Labs     12/25/22  0143 12/24/22  0305 12/23/22  1230   * 159* 116*    138 138   K 4.1 4.5 4.3    107 107   CO2 26 23 24   BUN 23* 17 15   CREA 1.07 1.02 1.10   CA 9.0 9.4 8.8   AGAP 5 8 7   BUCR 21* 17 14   AP  --   --  96   TP  --   --  6.7   ALB  --   --  3.1*   GLOB  --   --  3.6   AGRAT  --   --  0.9      CBC w/Diff Recent Labs     12/25/22  0143 12/24/22  0305 12/23/22  2117   WBC 12.1 7.7 8.2   RBC 3.78* 4.05* 4.12*   HGB 11.3* 12.1 12.3   HCT 34.3* 36.7 36.6    265 274   GRANS 73 84* 88*   LYMPH 21 13* 9*   EOS 0 0 0      Cardiac Enzymes No results for input(s): CPK, CKND1, CRISELDA in the last 72 hours.     No lab exists for component: CKRMB, TROIP   Coagulation Recent Labs     12/25/22  0143 12/24/22  1630 12/23/22  2117 12/23/22  1230   PTP  --   --   --  13.5   INR  --   --   --  1.0   APTT 94.8* 121.1*   < > 34.0    < > = values in this interval not displayed. Lipid Panel Lab Results   Component Value Date/Time    Cholesterol, total 201 (H) 10/15/2019 12:07 PM    HDL Cholesterol 59 10/15/2019 12:07 PM    LDL, calculated 125.6 (H) 10/15/2019 12:07 PM    VLDL, calculated 16.4 10/15/2019 12:07 PM    Triglyceride 82 10/15/2019 12:07 PM    CHOL/HDL Ratio 3.4 10/15/2019 12:07 PM      BNP No results for input(s): BNPP in the last 72 hours. Liver Enzymes Recent Labs     12/23/22  1230   TP 6.7   ALB 3.1*   AP 96      Thyroid Studies Lab Results   Component Value Date/Time    TSH 0.43 12/23/2022 09:17 PM            Significant Diagnostic Studies: CTA CHEST W OR W WO CONT    Result Date: 12/23/2022  EXAM: CTA Chest INDICATION: Shortness of breath. Possible PE. COMPARISON: No prior study. TECHNIQUE: Axial CT imaging from the thoracic inlet through the diaphragm with intravenous contrast utilizing CTA study for pulmonary artery evaluation. Coronal and sagittal MIP reformations were generated at a separate workstation. One or more dose reduction techniques were used on this CT: automated exposure control, adjustment of the mAs and/or kVp according to patient size, and iterative reconstruction techniques. The specific techniques used on this CT exam have been documented in the patient's electronic medical record. Digital Imaging and Communications in Medicine (DICOM) format image data are available to nonaffiliated external healthcare facilities or entities on a secure, media free, reciprocally searchable basis with patient authorization for at least a 12-month period after this study. _______________ FINDINGS: EXAM QUALITY: Overall exam quality is adequate. Pulmonary arterial enhancement is adequate. The breath hold is satisfactory.  PULMONARY ARTERIES: Right upper and lower lobe and lingula segmental and subsegmental emboli. RV to LV ratio 0.9. MEDIASTINUM: Normal heart size. Aorta is unremarkable. No pericardial effusion. LYMPH NODES: No enlarged mediastinal or hilar nodes by size criteria. AIRWAY: Unremarkable. LUNGS: No suspicious nodule or mass. No consolidation. PLEURA: No pleural effusion or pneumothorax. UPPER ABDOMEN: Visualized upper abdomen is unremarkable. . OTHER: No acute or aggressive osseous abnormalities identified. Bilateral breast implants. _______________     Mild upper and lower lobe segmental and subsegmental emboli. RV to LV ratio 0.9. No focal consolidation. Findings discussed with WHITNEY Kahn at 2:22 PM on 12/23/2022. XR CHEST PORT    Result Date: 12/23/2022  EXAM: XR CHEST PORT CLINICAL INDICATION/HISTORY: cough -Additional: None COMPARISON: 9/3/2013 TECHNIQUE: Frontal view of the chest _______________ FINDINGS: HEART AND MEDIASTINUM: Normal cardiac size and mediastinal contours. LUNGS AND PLEURAL SPACES: No focal pneumonic consolidation, pneumothorax, or pleural effusion. BONY THORAX AND SOFT TISSUES: No acute osseous abnormality _______________     No acute findings in the chest.     ECHO ADULT COMPLETE    Result Date: 12/25/2022    Left Ventricle: Normal left ventricular systolic function with a visually estimated EF of 55 - 60%. Left ventricle size is normal. Normal wall thickness. Normal wall motion. Diastolic dysfunction present with normal LV EF. Left Atrium: Left atrium size is normal   Tricuspid Valve:Valve structure is normal. Mild regurgitation. No stenosis noted. Est RA pressure is 3 mmHg. The estimated RVSP is 19 mmHg         No results found for this or any previous visit. Please note that this dictation was completed with Legend3D, the [employer] voice recognition software. Quite often unanticipated grammatical, syntax, homophones, and other interpretive errors are inadvertently transcribed by the computer software.   Please disregard these errors. Please excuse any errors that have escaped final proofreading.      CC: Tyrone Torres, DO

## 2022-12-26 LAB
BACTERIA SPEC CULT: ABNORMAL
CC UR VC: ABNORMAL
SERVICE CMNT-IMP: ABNORMAL

## 2023-01-22 PROBLEM — N39.0 UTI (URINARY TRACT INFECTION): Status: RESOLVED | Noted: 2022-12-23 | Resolved: 2023-01-22

## 2023-07-18 ENCOUNTER — HOSPITAL ENCOUNTER (OUTPATIENT)
Facility: HOSPITAL | Age: 68
Discharge: HOME | End: 2023-07-21
Payer: MEDICARE

## 2023-07-18 DIAGNOSIS — M16.11 PRIMARY OSTEOARTHRITIS OF RIGHT HIP: ICD-10-CM

## 2023-07-18 LAB
ALBUMIN SERPL-MCNC: 3.8 G/DL (ref 3.4–5)
ALBUMIN/GLOB SERPL: 1.2 (ref 0.8–1.7)
ALP SERPL-CCNC: 89 U/L (ref 45–117)
ALT SERPL-CCNC: 23 U/L (ref 13–56)
ANION GAP SERPL CALC-SCNC: 4 MMOL/L (ref 3–18)
APPEARANCE UR: CLEAR
APTT PPP: 32.8 SEC (ref 23–36.4)
AST SERPL-CCNC: 17 U/L (ref 10–38)
BACTERIA URNS QL MICRO: ABNORMAL /HPF
BASOPHILS # BLD: 0 K/UL (ref 0–0.1)
BASOPHILS NFR BLD: 1 % (ref 0–2)
BILIRUB SERPL-MCNC: 0.3 MG/DL (ref 0.2–1)
BILIRUB UR QL: NEGATIVE
BUN SERPL-MCNC: 19 MG/DL (ref 7–18)
BUN/CREAT SERPL: 17 (ref 12–20)
CALCIUM SERPL-MCNC: 9.1 MG/DL (ref 8.5–10.1)
CHLORIDE SERPL-SCNC: 109 MMOL/L (ref 100–111)
CO2 SERPL-SCNC: 27 MMOL/L (ref 21–32)
COLOR UR: YELLOW
CREAT SERPL-MCNC: 1.14 MG/DL (ref 0.6–1.3)
DIFFERENTIAL METHOD BLD: NORMAL
EOSINOPHIL # BLD: 0.1 K/UL (ref 0–0.4)
EOSINOPHIL NFR BLD: 1 % (ref 0–5)
EPITH CASTS URNS QL MICRO: ABNORMAL /LPF (ref 0–5)
ERYTHROCYTE [DISTWIDTH] IN BLOOD BY AUTOMATED COUNT: 13.1 % (ref 11.6–14.5)
ERYTHROCYTE [SEDIMENTATION RATE] IN BLOOD: 6 MM/HR (ref 0–30)
EST. AVERAGE GLUCOSE BLD GHB EST-MCNC: 108 MG/DL
GLOBULIN SER CALC-MCNC: 3.1 G/DL (ref 2–4)
GLUCOSE SERPL-MCNC: 101 MG/DL (ref 74–99)
GLUCOSE UR STRIP.AUTO-MCNC: NEGATIVE MG/DL
HBA1C MFR BLD: 5.4 % (ref 4.2–5.6)
HCT VFR BLD AUTO: 42.3 % (ref 35–45)
HGB BLD-MCNC: 14 G/DL (ref 12–16)
HGB UR QL STRIP: NEGATIVE
IMM GRANULOCYTES # BLD AUTO: 0 K/UL (ref 0–0.04)
IMM GRANULOCYTES NFR BLD AUTO: 0 % (ref 0–0.5)
INR PPP: 0.9 (ref 0.8–1.2)
KETONES UR QL STRIP.AUTO: NEGATIVE MG/DL
LEUKOCYTE ESTERASE UR QL STRIP.AUTO: ABNORMAL
LYMPHOCYTES # BLD: 2.6 K/UL (ref 0.9–3.6)
LYMPHOCYTES NFR BLD: 34 % (ref 21–52)
MCH RBC QN AUTO: 30.1 PG (ref 24–34)
MCHC RBC AUTO-ENTMCNC: 33.1 G/DL (ref 31–37)
MCV RBC AUTO: 91 FL (ref 78–100)
MONOCYTES # BLD: 0.6 K/UL (ref 0.05–1.2)
MONOCYTES NFR BLD: 8 % (ref 3–10)
NEUTS SEG # BLD: 4.3 K/UL (ref 1.8–8)
NEUTS SEG NFR BLD: 56 % (ref 40–73)
NITRITE UR QL STRIP.AUTO: NEGATIVE
NRBC # BLD: 0 K/UL (ref 0–0.01)
NRBC BLD-RTO: 0 PER 100 WBC
PH UR STRIP: 6.5 (ref 5–8)
PLATELET # BLD AUTO: 278 K/UL (ref 135–420)
PMV BLD AUTO: 9.9 FL (ref 9.2–11.8)
POTASSIUM SERPL-SCNC: 4.3 MMOL/L (ref 3.5–5.5)
PROT SERPL-MCNC: 6.9 G/DL (ref 6.4–8.2)
PROT UR STRIP-MCNC: NEGATIVE MG/DL
PROTHROMBIN TIME: 12.2 SEC (ref 11.5–15.2)
RBC # BLD AUTO: 4.65 M/UL (ref 4.2–5.3)
RBC #/AREA URNS HPF: ABNORMAL /HPF (ref 0–5)
SODIUM SERPL-SCNC: 140 MMOL/L (ref 136–145)
SP GR UR REFRACTOMETRY: 1.01 (ref 1–1.03)
UROBILINOGEN UR QL STRIP.AUTO: 0.2 EU/DL (ref 0.2–1)
WBC # BLD AUTO: 7.6 K/UL (ref 4.6–13.2)
WBC URNS QL MICRO: ABNORMAL /HPF (ref 0–5)

## 2023-07-18 PROCEDURE — 85652 RBC SED RATE AUTOMATED: CPT

## 2023-07-18 PROCEDURE — 85730 THROMBOPLASTIN TIME PARTIAL: CPT

## 2023-07-18 PROCEDURE — 85025 COMPLETE CBC W/AUTO DIFF WBC: CPT

## 2023-07-18 PROCEDURE — 85610 PROTHROMBIN TIME: CPT

## 2023-07-18 PROCEDURE — 36415 COLL VENOUS BLD VENIPUNCTURE: CPT

## 2023-07-18 PROCEDURE — 83036 HEMOGLOBIN GLYCOSYLATED A1C: CPT

## 2023-07-18 PROCEDURE — 80053 COMPREHEN METABOLIC PANEL: CPT

## 2023-07-18 PROCEDURE — 81001 URINALYSIS AUTO W/SCOPE: CPT

## 2023-07-18 PROCEDURE — 93005 ELECTROCARDIOGRAM TRACING: CPT

## 2023-07-19 LAB
BACTERIA SPEC CULT: NORMAL
BACTERIA SPEC CULT: NORMAL
EKG ATRIAL RATE: 78 BPM
EKG DIAGNOSIS: NORMAL
EKG P AXIS: 53 DEGREES
EKG P-R INTERVAL: 152 MS
EKG Q-T INTERVAL: 382 MS
EKG QRS DURATION: 84 MS
EKG QTC CALCULATION (BAZETT): 435 MS
EKG R AXIS: -6 DEGREES
EKG T AXIS: 65 DEGREES
EKG VENTRICULAR RATE: 78 BPM
SERVICE CMNT-IMP: NORMAL

## 2023-08-13 ENCOUNTER — ANESTHESIA EVENT (OUTPATIENT)
Facility: HOSPITAL | Age: 68
End: 2023-08-13
Payer: MEDICARE

## 2023-08-13 PROBLEM — M16.11 OSTEOARTHRITIS OF RIGHT HIP: Chronic | Status: ACTIVE | Noted: 2023-08-13

## 2023-08-13 RX ORDER — DIPHENHYDRAMINE HYDROCHLORIDE 50 MG/ML
25 INJECTION INTRAMUSCULAR; INTRAVENOUS EVERY 6 HOURS PRN
Status: CANCELLED | OUTPATIENT
Start: 2023-08-13

## 2023-08-13 RX ORDER — SODIUM CHLORIDE 0.9 % (FLUSH) 0.9 %
5-40 SYRINGE (ML) INJECTION PRN
Status: CANCELLED | OUTPATIENT
Start: 2023-08-13

## 2023-08-13 RX ORDER — SODIUM CHLORIDE 9 MG/ML
INJECTION, SOLUTION INTRAVENOUS CONTINUOUS
Status: CANCELLED | OUTPATIENT
Start: 2023-08-13

## 2023-08-13 RX ORDER — ACETAMINOPHEN 500 MG
1000 TABLET ORAL ONCE
Status: CANCELLED | OUTPATIENT
Start: 2023-08-13 | End: 2023-08-13

## 2023-08-13 RX ORDER — DIPHENHYDRAMINE HCL 25 MG
25 CAPSULE ORAL EVERY 6 HOURS PRN
Status: CANCELLED | OUTPATIENT
Start: 2023-08-13

## 2023-08-13 RX ORDER — SODIUM CHLORIDE 9 MG/ML
INJECTION, SOLUTION INTRAVENOUS CONTINUOUS
Status: CANCELLED | OUTPATIENT
Start: 2023-08-13 | End: 2023-08-13

## 2023-08-13 NOTE — H&P
TOTAL KNEE ARTHROPLASTY Left 2013    TOTAL KNEE ARTHROPLASTY Right 2010     Social History     Occupational History    Not on file   Tobacco Use    Smoking status: Never    Smokeless tobacco: Never   Vaping Use    Vaping Use: Never used   Substance and Sexual Activity    Alcohol use: Yes     Comment: \"rare\" once every three months    Drug use: No    Sexual activity: Not on file     Comment: not assessed     Prior to Admission medications    Medication Sig Start Date End Date Taking? Authorizing Provider   aspirin 81 MG EC tablet Take 1 tablet by mouth daily    Historical Provider, MD   acetaminophen (TYLENOL) 325 MG tablet Take 650 mg by mouth every 4 hours as needed    Ar Automatic Reconciliation   albuterol sulfate HFA (PROVENTIL;VENTOLIN;PROAIR) 108 (90 Base) MCG/ACT inhaler Inhale 2 puffs into the lungs every 6 hours as needed    Ar Automatic Reconciliation   vitamin D (CHOLECALCIFEROL) 25 MCG (1000 UT) TABS tablet Take 2 tablets by mouth    Ar Automatic Reconciliation   levothyroxine (SYNTHROID) 150 MCG tablet Take 150 mcg by mouth every morning (before breakfast)    Ar Automatic Reconciliation       Allergies: Allergies   Allergen Reactions    Erythromycin Nausea And Vomiting        Review of Systems:  A comprehensive review of systems was negative except for that written in the History of Present Illness. Objective:       Physical Exam:  HEENT: Normocephalic, atraumatic  Lungs:  Clear to auscultation  Heart:   Regular rate and rhythm  Abdomen: Soft  Extremities:  Pain with range of motion of the right hip. Passive flexion  degrees,                       passive internal rotation 0-10 degrees, with pain throughout ROM,                        passive external rotation 10-20 degrees with pain at the arc of motion. Antalgic gait noted. Assessment:      Arthritis of the right hip. Plan:       Proceed with scheduled RIGHT TOTAL HIP ARTHROPLASTY.     The various methods of

## 2023-08-14 ENCOUNTER — HOSPITAL ENCOUNTER (OUTPATIENT)
Facility: HOSPITAL | Age: 68
Setting detail: OUTPATIENT SURGERY
Discharge: HOME | End: 2023-08-14
Attending: ORTHOPAEDIC SURGERY | Admitting: ORTHOPAEDIC SURGERY
Payer: MEDICARE

## 2023-08-14 ENCOUNTER — APPOINTMENT (OUTPATIENT)
Facility: HOSPITAL | Age: 68
End: 2023-08-14
Attending: ORTHOPAEDIC SURGERY
Payer: MEDICARE

## 2023-08-14 ENCOUNTER — ANESTHESIA (OUTPATIENT)
Facility: HOSPITAL | Age: 68
End: 2023-08-14
Payer: MEDICARE

## 2023-08-14 VITALS
TEMPERATURE: 97 F | RESPIRATION RATE: 18 BRPM | HEART RATE: 76 BPM | DIASTOLIC BLOOD PRESSURE: 59 MMHG | OXYGEN SATURATION: 98 % | SYSTOLIC BLOOD PRESSURE: 104 MMHG

## 2023-08-14 DIAGNOSIS — M16.11 PRIMARY OSTEOARTHRITIS OF RIGHT HIP: Primary | Chronic | ICD-10-CM

## 2023-08-14 LAB
ABO + RH BLD: NORMAL
BLOOD GROUP ANTIBODIES SERPL: NORMAL
SPECIMEN EXP DATE BLD: NORMAL

## 2023-08-14 PROCEDURE — 3600000015 HC SURGERY LEVEL 5 ADDTL 15MIN: Performed by: ORTHOPAEDIC SURGERY

## 2023-08-14 PROCEDURE — 2709999900 HC NON-CHARGEABLE SUPPLY: Performed by: ORTHOPAEDIC SURGERY

## 2023-08-14 PROCEDURE — 86850 RBC ANTIBODY SCREEN: CPT

## 2023-08-14 PROCEDURE — 86900 BLOOD TYPING SEROLOGIC ABO: CPT

## 2023-08-14 PROCEDURE — 6360000002 HC RX W HCPCS: Performed by: ORTHOPAEDIC SURGERY

## 2023-08-14 PROCEDURE — 6360000002 HC RX W HCPCS: Performed by: ANESTHESIOLOGY

## 2023-08-14 PROCEDURE — 73501 X-RAY EXAM HIP UNI 1 VIEW: CPT

## 2023-08-14 PROCEDURE — C1776 JOINT DEVICE (IMPLANTABLE): HCPCS | Performed by: ORTHOPAEDIC SURGERY

## 2023-08-14 PROCEDURE — 7100000000 HC PACU RECOVERY - FIRST 15 MIN: Performed by: ORTHOPAEDIC SURGERY

## 2023-08-14 PROCEDURE — 7100000011 HC PHASE II RECOVERY - ADDTL 15 MIN: Performed by: ORTHOPAEDIC SURGERY

## 2023-08-14 PROCEDURE — 2580000003 HC RX 258: Performed by: ORTHOPAEDIC SURGERY

## 2023-08-14 PROCEDURE — 6360000002 HC RX W HCPCS: Performed by: PHYSICIAN ASSISTANT

## 2023-08-14 PROCEDURE — 7100000001 HC PACU RECOVERY - ADDTL 15 MIN: Performed by: ORTHOPAEDIC SURGERY

## 2023-08-14 PROCEDURE — 7100000010 HC PHASE II RECOVERY - FIRST 15 MIN: Performed by: ORTHOPAEDIC SURGERY

## 2023-08-14 PROCEDURE — 3700000000 HC ANESTHESIA ATTENDED CARE: Performed by: ORTHOPAEDIC SURGERY

## 2023-08-14 PROCEDURE — 6370000000 HC RX 637 (ALT 250 FOR IP): Performed by: ANESTHESIOLOGY

## 2023-08-14 PROCEDURE — 86901 BLOOD TYPING SEROLOGIC RH(D): CPT

## 2023-08-14 PROCEDURE — 97161 PT EVAL LOW COMPLEX 20 MIN: CPT

## 2023-08-14 PROCEDURE — 6370000000 HC RX 637 (ALT 250 FOR IP): Performed by: PHYSICIAN ASSISTANT

## 2023-08-14 PROCEDURE — 3700000001 HC ADD 15 MINUTES (ANESTHESIA): Performed by: ORTHOPAEDIC SURGERY

## 2023-08-14 PROCEDURE — 6360000002 HC RX W HCPCS

## 2023-08-14 PROCEDURE — 2580000003 HC RX 258: Performed by: ANESTHESIOLOGY

## 2023-08-14 PROCEDURE — 97165 OT EVAL LOW COMPLEX 30 MIN: CPT

## 2023-08-14 PROCEDURE — 2580000003 HC RX 258: Performed by: PHYSICIAN ASSISTANT

## 2023-08-14 PROCEDURE — 2500000003 HC RX 250 WO HCPCS: Performed by: PHYSICIAN ASSISTANT

## 2023-08-14 PROCEDURE — 2500000003 HC RX 250 WO HCPCS

## 2023-08-14 PROCEDURE — 3600000005 HC SURGERY LEVEL 5 BASE: Performed by: ORTHOPAEDIC SURGERY

## 2023-08-14 PROCEDURE — 2500000003 HC RX 250 WO HCPCS: Performed by: ORTHOPAEDIC SURGERY

## 2023-08-14 PROCEDURE — 97116 GAIT TRAINING THERAPY: CPT

## 2023-08-14 PROCEDURE — C1713 ANCHOR/SCREW BN/BN,TIS/BN: HCPCS | Performed by: ORTHOPAEDIC SURGERY

## 2023-08-14 PROCEDURE — 2720000010 HC SURG SUPPLY STERILE: Performed by: ORTHOPAEDIC SURGERY

## 2023-08-14 PROCEDURE — 97535 SELF CARE MNGMENT TRAINING: CPT

## 2023-08-14 PROCEDURE — 36415 COLL VENOUS BLD VENIPUNCTURE: CPT

## 2023-08-14 DEVICE — FEMORAL HEAD 36-12/14+3
Type: IMPLANTABLE DEVICE | Site: HIP | Status: FUNCTIONAL
Brand: DELTA CERAMIC FEMORAL HEAD

## 2023-08-14 DEVICE — ACETABULAR LINER NEUTRAL 36/54
Type: IMPLANTABLE DEVICE | Site: HIP | Status: FUNCTIONAL
Brand: LEGEND

## 2023-08-14 DEVICE — THREE HOLE, 54 MM
Type: IMPLANTABLE DEVICE | Site: HIP | Status: FUNCTIONAL
Brand: LEGEND ACETABULAR SHELL

## 2023-08-14 DEVICE — SIZE 13 STD COLLAR
Type: IMPLANTABLE DEVICE | Site: HIP | Status: FUNCTIONAL
Brand: ENTRADA HIP STEM

## 2023-08-14 RX ORDER — HYDROMORPHONE HYDROCHLORIDE 1 MG/ML
0.5 INJECTION, SOLUTION INTRAMUSCULAR; INTRAVENOUS; SUBCUTANEOUS EVERY 5 MIN PRN
Status: COMPLETED | OUTPATIENT
Start: 2023-08-14 | End: 2023-08-14

## 2023-08-14 RX ORDER — OXYCODONE HYDROCHLORIDE 5 MG/1
5 TABLET ORAL EVERY 4 HOURS PRN
Status: DISCONTINUED | OUTPATIENT
Start: 2023-08-14 | End: 2023-08-14 | Stop reason: HOSPADM

## 2023-08-14 RX ORDER — SODIUM CHLORIDE 9 MG/ML
INJECTION, SOLUTION INTRAVENOUS PRN
Status: DISCONTINUED | OUTPATIENT
Start: 2023-08-14 | End: 2023-08-14 | Stop reason: HOSPADM

## 2023-08-14 RX ORDER — SODIUM CHLORIDE, SODIUM LACTATE, POTASSIUM CHLORIDE, CALCIUM CHLORIDE 600; 310; 30; 20 MG/100ML; MG/100ML; MG/100ML; MG/100ML
INJECTION, SOLUTION INTRAVENOUS CONTINUOUS
Status: DISCONTINUED | OUTPATIENT
Start: 2023-08-14 | End: 2023-08-14 | Stop reason: HOSPADM

## 2023-08-14 RX ORDER — TRANEXAMIC ACID 10 MG/ML
1000 INJECTION, SOLUTION INTRAVENOUS ONCE
Status: COMPLETED | OUTPATIENT
Start: 2023-08-14 | End: 2023-08-14

## 2023-08-14 RX ORDER — PROPOFOL 10 MG/ML
INJECTION, EMULSION INTRAVENOUS PRN
Status: DISCONTINUED | OUTPATIENT
Start: 2023-08-14 | End: 2023-08-14 | Stop reason: SDUPTHER

## 2023-08-14 RX ORDER — BUPROPION HYDROCHLORIDE 75 MG/1
75 TABLET ORAL ONCE
COMMUNITY

## 2023-08-14 RX ORDER — ONDANSETRON 2 MG/ML
INJECTION INTRAMUSCULAR; INTRAVENOUS PRN
Status: DISCONTINUED | OUTPATIENT
Start: 2023-08-14 | End: 2023-08-14 | Stop reason: SDUPTHER

## 2023-08-14 RX ORDER — LABETALOL HYDROCHLORIDE 5 MG/ML
10 INJECTION, SOLUTION INTRAVENOUS
Status: DISCONTINUED | OUTPATIENT
Start: 2023-08-14 | End: 2023-08-14 | Stop reason: HOSPADM

## 2023-08-14 RX ORDER — DIPHENHYDRAMINE HYDROCHLORIDE 50 MG/ML
12.5 INJECTION INTRAMUSCULAR; INTRAVENOUS
Status: DISCONTINUED | OUTPATIENT
Start: 2023-08-14 | End: 2023-08-14 | Stop reason: HOSPADM

## 2023-08-14 RX ORDER — PANTOPRAZOLE SODIUM 40 MG/1
40 TABLET, DELAYED RELEASE ORAL ONCE
Status: COMPLETED | OUTPATIENT
Start: 2023-08-14 | End: 2023-08-14

## 2023-08-14 RX ORDER — OXYCODONE HYDROCHLORIDE 5 MG/1
5 TABLET ORAL EVERY 4 HOURS PRN
Qty: 42 TABLET | Refills: 0 | Status: SHIPPED | OUTPATIENT
Start: 2023-08-14 | End: 2023-08-21

## 2023-08-14 RX ORDER — DROPERIDOL 2.5 MG/ML
0.62 INJECTION, SOLUTION INTRAMUSCULAR; INTRAVENOUS
Status: DISCONTINUED | OUTPATIENT
Start: 2023-08-14 | End: 2023-08-14 | Stop reason: HOSPADM

## 2023-08-14 RX ORDER — SODIUM CHLORIDE 0.9 % (FLUSH) 0.9 %
5-40 SYRINGE (ML) INJECTION PRN
Status: DISCONTINUED | OUTPATIENT
Start: 2023-08-14 | End: 2023-08-14 | Stop reason: HOSPADM

## 2023-08-14 RX ORDER — OXYCODONE HYDROCHLORIDE 5 MG/1
5 TABLET ORAL
Status: COMPLETED | OUTPATIENT
Start: 2023-08-14 | End: 2023-08-14

## 2023-08-14 RX ORDER — MEPERIDINE HYDROCHLORIDE 50 MG/ML
12.5 INJECTION INTRAMUSCULAR; INTRAVENOUS; SUBCUTANEOUS ONCE
Status: DISCONTINUED | OUTPATIENT
Start: 2023-08-14 | End: 2023-08-14 | Stop reason: HOSPADM

## 2023-08-14 RX ORDER — DEXAMETHASONE SODIUM PHOSPHATE 4 MG/ML
8 INJECTION, SOLUTION INTRA-ARTICULAR; INTRALESIONAL; INTRAMUSCULAR; INTRAVENOUS; SOFT TISSUE ONCE
Status: COMPLETED | OUTPATIENT
Start: 2023-08-14 | End: 2023-08-14

## 2023-08-14 RX ORDER — SODIUM CHLORIDE 0.9 % (FLUSH) 0.9 %
5-40 SYRINGE (ML) INJECTION EVERY 12 HOURS SCHEDULED
Status: DISCONTINUED | OUTPATIENT
Start: 2023-08-14 | End: 2023-08-14 | Stop reason: HOSPADM

## 2023-08-14 RX ORDER — FENTANYL CITRATE 50 UG/ML
25 INJECTION, SOLUTION INTRAMUSCULAR; INTRAVENOUS EVERY 5 MIN PRN
Status: DISCONTINUED | OUTPATIENT
Start: 2023-08-14 | End: 2023-08-14 | Stop reason: HOSPADM

## 2023-08-14 RX ORDER — FENTANYL CITRATE 50 UG/ML
25 INJECTION, SOLUTION INTRAMUSCULAR; INTRAVENOUS EVERY 5 MIN PRN
Status: COMPLETED | OUTPATIENT
Start: 2023-08-14 | End: 2023-08-14

## 2023-08-14 RX ORDER — OXYCODONE HYDROCHLORIDE 5 MG/1
10 TABLET ORAL EVERY 4 HOURS PRN
Status: DISCONTINUED | OUTPATIENT
Start: 2023-08-14 | End: 2023-08-14 | Stop reason: HOSPADM

## 2023-08-14 RX ORDER — ONDANSETRON 2 MG/ML
4 INJECTION INTRAMUSCULAR; INTRAVENOUS
Status: DISCONTINUED | OUTPATIENT
Start: 2023-08-14 | End: 2023-08-14 | Stop reason: HOSPADM

## 2023-08-14 RX ORDER — DEXAMETHASONE SODIUM PHOSPHATE 4 MG/ML
INJECTION, SOLUTION INTRA-ARTICULAR; INTRALESIONAL; INTRAMUSCULAR; INTRAVENOUS; SOFT TISSUE PRN
Status: DISCONTINUED | OUTPATIENT
Start: 2023-08-14 | End: 2023-08-14 | Stop reason: SDUPTHER

## 2023-08-14 RX ORDER — CEFADROXIL 500 MG/1
500 CAPSULE ORAL 2 TIMES DAILY
Qty: 10 CAPSULE | Refills: 0 | Status: SHIPPED | OUTPATIENT
Start: 2023-08-14 | End: 2023-08-19

## 2023-08-14 RX ORDER — IPRATROPIUM BROMIDE AND ALBUTEROL SULFATE 2.5; .5 MG/3ML; MG/3ML
1 SOLUTION RESPIRATORY (INHALATION)
Status: DISCONTINUED | OUTPATIENT
Start: 2023-08-14 | End: 2023-08-14 | Stop reason: HOSPADM

## 2023-08-14 RX ORDER — ONDANSETRON 2 MG/ML
4 INJECTION INTRAMUSCULAR; INTRAVENOUS EVERY 6 HOURS PRN
Status: DISCONTINUED | OUTPATIENT
Start: 2023-08-14 | End: 2023-08-14 | Stop reason: HOSPADM

## 2023-08-14 RX ORDER — KETOROLAC TROMETHAMINE 15 MG/ML
15 INJECTION, SOLUTION INTRAMUSCULAR; INTRAVENOUS EVERY 6 HOURS
Status: DISCONTINUED | OUTPATIENT
Start: 2023-08-14 | End: 2023-08-14 | Stop reason: HOSPADM

## 2023-08-14 RX ORDER — MIDAZOLAM HYDROCHLORIDE 2 MG/2ML
INJECTION, SOLUTION INTRAMUSCULAR; INTRAVENOUS PRN
Status: DISCONTINUED | OUTPATIENT
Start: 2023-08-14 | End: 2023-08-14 | Stop reason: SDUPTHER

## 2023-08-14 RX ORDER — ACETAMINOPHEN 500 MG
1000 TABLET ORAL ONCE
Status: COMPLETED | OUTPATIENT
Start: 2023-08-14 | End: 2023-08-14

## 2023-08-14 RX ORDER — FENTANYL CITRATE 50 UG/ML
INJECTION, SOLUTION INTRAMUSCULAR; INTRAVENOUS PRN
Status: DISCONTINUED | OUTPATIENT
Start: 2023-08-14 | End: 2023-08-14 | Stop reason: SDUPTHER

## 2023-08-14 RX ORDER — LIDOCAINE HYDROCHLORIDE 20 MG/ML
INJECTION, SOLUTION EPIDURAL; INFILTRATION; INTRACAUDAL; PERINEURAL PRN
Status: DISCONTINUED | OUTPATIENT
Start: 2023-08-14 | End: 2023-08-14 | Stop reason: SDUPTHER

## 2023-08-14 RX ORDER — SODIUM CHLORIDE, SODIUM LACTATE, POTASSIUM CHLORIDE, AND CALCIUM CHLORIDE .6; .31; .03; .02 G/100ML; G/100ML; G/100ML; G/100ML
1000 INJECTION, SOLUTION INTRAVENOUS ONCE
Status: COMPLETED | OUTPATIENT
Start: 2023-08-14 | End: 2023-08-14

## 2023-08-14 RX ORDER — GLYCOPYRROLATE 0.2 MG/ML
INJECTION INTRAMUSCULAR; INTRAVENOUS PRN
Status: DISCONTINUED | OUTPATIENT
Start: 2023-08-14 | End: 2023-08-14 | Stop reason: SDUPTHER

## 2023-08-14 RX ORDER — DEXMEDETOMIDINE HYDROCHLORIDE 100 UG/ML
INJECTION, SOLUTION INTRAVENOUS PRN
Status: DISCONTINUED | OUTPATIENT
Start: 2023-08-14 | End: 2023-08-14 | Stop reason: SDUPTHER

## 2023-08-14 RX ORDER — ACETAMINOPHEN 325 MG/1
650 TABLET ORAL EVERY 6 HOURS
Status: DISCONTINUED | OUTPATIENT
Start: 2023-08-14 | End: 2023-08-14 | Stop reason: HOSPADM

## 2023-08-14 RX ORDER — EPHEDRINE SULFATE/0.9% NACL/PF 50 MG/5 ML
SYRINGE (ML) INTRAVENOUS PRN
Status: DISCONTINUED | OUTPATIENT
Start: 2023-08-14 | End: 2023-08-14 | Stop reason: SDUPTHER

## 2023-08-14 RX ORDER — CELECOXIB 100 MG/1
200 CAPSULE ORAL ONCE
Status: COMPLETED | OUTPATIENT
Start: 2023-08-14 | End: 2023-08-14

## 2023-08-14 RX ADMIN — OXYCODONE HYDROCHLORIDE 5 MG: 5 TABLET ORAL at 12:20

## 2023-08-14 RX ADMIN — DEXAMETHASONE SODIUM PHOSPHATE 4 MG: 4 INJECTION, SOLUTION INTRAMUSCULAR; INTRAVENOUS at 07:51

## 2023-08-14 RX ADMIN — ONDANSETRON 4 MG: 2 INJECTION INTRAMUSCULAR; INTRAVENOUS at 08:39

## 2023-08-14 RX ADMIN — DEXMEDETOMIDINE 6 MCG: 100 INJECTION, SOLUTION INTRAVENOUS at 08:03

## 2023-08-14 RX ADMIN — FENTANYL CITRATE 50 MCG: 50 INJECTION, SOLUTION INTRAMUSCULAR; INTRAVENOUS at 08:29

## 2023-08-14 RX ADMIN — HYDROMORPHONE HYDROCHLORIDE 0.5 MG: 1 INJECTION, SOLUTION INTRAMUSCULAR; INTRAVENOUS; SUBCUTANEOUS at 09:48

## 2023-08-14 RX ADMIN — Medication 10 MG: at 07:49

## 2023-08-14 RX ADMIN — GLYCOPYRROLATE 0.2 MG: 0.2 INJECTION INTRAMUSCULAR; INTRAVENOUS at 07:38

## 2023-08-14 RX ADMIN — FENTANYL CITRATE 25 MCG: 50 INJECTION, SOLUTION INTRAMUSCULAR; INTRAVENOUS at 09:30

## 2023-08-14 RX ADMIN — Medication 2000 MG: at 07:46

## 2023-08-14 RX ADMIN — OXYCODONE HYDROCHLORIDE 5 MG: 5 TABLET ORAL at 10:43

## 2023-08-14 RX ADMIN — DEXMEDETOMIDINE 4 MCG: 100 INJECTION, SOLUTION INTRAVENOUS at 08:20

## 2023-08-14 RX ADMIN — LIDOCAINE HYDROCHLORIDE 100 MG: 20 INJECTION, SOLUTION EPIDURAL; INFILTRATION; INTRACAUDAL; PERINEURAL at 07:38

## 2023-08-14 RX ADMIN — TRANEXAMIC ACID 1000 MG: 10 INJECTION, SOLUTION INTRAVENOUS at 07:50

## 2023-08-14 RX ADMIN — HYDROMORPHONE HYDROCHLORIDE 0.5 MG: 1 INJECTION, SOLUTION INTRAMUSCULAR; INTRAVENOUS; SUBCUTANEOUS at 09:42

## 2023-08-14 RX ADMIN — SODIUM CHLORIDE, POTASSIUM CHLORIDE, SODIUM LACTATE AND CALCIUM CHLORIDE: 600; 310; 30; 20 INJECTION, SOLUTION INTRAVENOUS at 06:27

## 2023-08-14 RX ADMIN — KETOROLAC TROMETHAMINE 15 MG: 15 INJECTION, SOLUTION INTRAMUSCULAR; INTRAVENOUS at 09:31

## 2023-08-14 RX ADMIN — HYDROMORPHONE HYDROCHLORIDE 0.5 MG: 1 INJECTION, SOLUTION INTRAMUSCULAR; INTRAVENOUS; SUBCUTANEOUS at 07:41

## 2023-08-14 RX ADMIN — MIDAZOLAM HYDROCHLORIDE 2 MG: 1 INJECTION, SOLUTION INTRAMUSCULAR; INTRAVENOUS at 07:35

## 2023-08-14 RX ADMIN — DEXAMETHASONE SODIUM PHOSPHATE 8 MG: 4 INJECTION INTRA-ARTICULAR; INTRALESIONAL; INTRAMUSCULAR; INTRAVENOUS; SOFT TISSUE at 09:21

## 2023-08-14 RX ADMIN — SODIUM CHLORIDE, POTASSIUM CHLORIDE, SODIUM LACTATE AND CALCIUM CHLORIDE: 600; 310; 30; 20 INJECTION, SOLUTION INTRAVENOUS at 09:33

## 2023-08-14 RX ADMIN — PANTOPRAZOLE SODIUM 40 MG: 40 TABLET, DELAYED RELEASE ORAL at 06:28

## 2023-08-14 RX ADMIN — HYDROMORPHONE HYDROCHLORIDE 0.5 MG: 1 INJECTION, SOLUTION INTRAMUSCULAR; INTRAVENOUS; SUBCUTANEOUS at 08:04

## 2023-08-14 RX ADMIN — HYDROMORPHONE HYDROCHLORIDE 0.5 MG: 1 INJECTION, SOLUTION INTRAMUSCULAR; INTRAVENOUS; SUBCUTANEOUS at 09:19

## 2023-08-14 RX ADMIN — ACETAMINOPHEN 1000 MG: 500 TABLET ORAL at 06:28

## 2023-08-14 RX ADMIN — DEXMEDETOMIDINE 4 MCG: 100 INJECTION, SOLUTION INTRAVENOUS at 08:06

## 2023-08-14 RX ADMIN — HYDROMORPHONE HYDROCHLORIDE 0.5 MG: 1 INJECTION, SOLUTION INTRAMUSCULAR; INTRAVENOUS; SUBCUTANEOUS at 09:25

## 2023-08-14 RX ADMIN — CELECOXIB 200 MG: 100 CAPSULE ORAL at 06:28

## 2023-08-14 RX ADMIN — SODIUM CHLORIDE, POTASSIUM CHLORIDE, SODIUM LACTATE AND CALCIUM CHLORIDE 1000 ML: 600; 310; 30; 20 INJECTION, SOLUTION INTRAVENOUS at 06:27

## 2023-08-14 RX ADMIN — FENTANYL CITRATE 25 MCG: 50 INJECTION, SOLUTION INTRAMUSCULAR; INTRAVENOUS at 09:34

## 2023-08-14 RX ADMIN — PROPOFOL 200 MG: 10 INJECTION, EMULSION INTRAVENOUS at 07:38

## 2023-08-14 RX ADMIN — TRANEXAMIC ACID 1000 MG: 10 INJECTION, SOLUTION INTRAVENOUS at 08:40

## 2023-08-14 RX ADMIN — PROPOFOL 100 MG: 10 INJECTION, EMULSION INTRAVENOUS at 07:42

## 2023-08-14 RX ADMIN — DEXAMETHASONE SODIUM PHOSPHATE 8 MG: 4 INJECTION INTRA-ARTICULAR; INTRALESIONAL; INTRAMUSCULAR; INTRAVENOUS; SOFT TISSUE at 06:29

## 2023-08-14 RX ADMIN — DEXMEDETOMIDINE 6 MCG: 100 INJECTION, SOLUTION INTRAVENOUS at 07:45

## 2023-08-14 ASSESSMENT — PAIN DESCRIPTION - DESCRIPTORS
DESCRIPTORS: ACHING
DESCRIPTORS: ACHING;DULL
DESCRIPTORS: ACHING

## 2023-08-14 ASSESSMENT — PAIN DESCRIPTION - LOCATION
LOCATION: HIP;KNEE
LOCATION: HIP
LOCATION: HIP;KNEE
LOCATION: HIP
LOCATION: HIP

## 2023-08-14 ASSESSMENT — PAIN DESCRIPTION - ORIENTATION
ORIENTATION: RIGHT

## 2023-08-14 ASSESSMENT — PAIN SCALES - GENERAL
PAINLEVEL_OUTOF10: 6
PAINLEVEL_OUTOF10: 1
PAINLEVEL_OUTOF10: 4
PAINLEVEL_OUTOF10: 10
PAINLEVEL_OUTOF10: 6
PAINLEVEL_OUTOF10: 0
PAINLEVEL_OUTOF10: 0
PAINLEVEL_OUTOF10: 10
PAINLEVEL_OUTOF10: 0
PAINLEVEL_OUTOF10: 10
PAINLEVEL_OUTOF10: 10

## 2023-08-14 ASSESSMENT — PAIN - FUNCTIONAL ASSESSMENT: PAIN_FUNCTIONAL_ASSESSMENT: 0-10

## 2023-08-14 NOTE — PERIOP NOTE
TRANSFER - OUT REPORT:    Verbal report given to [address]  on [address]  being transferred to Phase II  for routine progression of patient care       Report consisted of patient's Situation, Background, Assessment and   Recommendations(SBAR). Information from the following report(s) Adult Overview, Surgery Report, Intake/Output, and MAR was reviewed with the receiving nurse. Lines:   Peripheral IV 08/14/23 Left;Posterior Hand (Active)   Site Assessment Clean, dry & intact 08/14/23 0949   Line Status Infusing 08/14/23 0949   Phlebitis Assessment No symptoms 08/14/23 0626   Infiltration Assessment 0 08/14/23 0626   Alcohol Cap Used No 08/14/23 0626   Dressing Status New dressing applied;Clean, dry & intact 08/14/23 0626   Dressing Type Transparent 08/14/23 0626   Dressing Intervention New 08/14/23 0626        Opportunity for questions and clarification was provided.       Patient transported with:  [employer]

## 2023-08-14 NOTE — DISCHARGE SUMMARY
[address] [place] [address]     DISCHARGE SUMMARY     PATIENT: Ladonna Jewell     MRN: [id number]   ADMIT DATE: 2023   BILLIN   DISCHARGE DATE: 2023     ATTENDING: Cecilia Freeman MD   DICTATING: AGNELINA Winn     ADMISSION DIAGNOSIS: Osteoarthritis of right hip, unspecified osteoarthritis type [M16.11]    DISCHARGE DIAGNOSIS: Status post RIGHT TOTAL HIP ARTHROPLASTY    HISTORY OF PRESENT ILLNESS: The patient is a 79y.o. year-old female   with ongoing right hip pain secondary to osteoarthritis of right hip. The patient's pain has persisted and progressed despite conservative treatments and therapies. The patient has at this time opted for surgical intervention.     PAST MEDICAL HISTORY:   Past Medical History:   Diagnosis Date    Arthritis     Asthma     well-controlled    Back pain     thrown off horse    COVID-19 2022    DVT (deep venous thrombosis) ([address]) 2022    multiple, finished eliquis 2023, aspirin managed by Davis Ray    Family history of colon cancer in father     GERD (gastroesophageal reflux disease)     GERD (gastroesophageal reflux disease)     H/O being hospitalized 2022    SOB, UTI, PE    Hypothyroidism     since 30-40 years ago, does not need to follow endocrinology    Menopause     age 48    Osteoarthritis     knees, right hip, lower back, Cecilia Freeman    Osteoarthritis of right hip 2023    Pulmonary embolism ([address]) 2022    multiple, finished eliquis 2023, aspirin managed by Davis Ray    Rectocele     Sleep apnea     not using CPAP     (spontaneous vaginal delivery)      (spontaneous vaginal delivery)        PAST SURGICAL HISTORY:   Past Surgical History:   Procedure Laterality Date    COLONOSCOPY N/A 2021    COLONOSCOPY performed by Danyelle Camara MD at [facility]COPY    COLONOSCOPY  2015    [address] [place] [address]    left

## 2023-08-14 NOTE — PERIOP NOTE
Reviewed PTA medication list with patient/caregiver and patient/caregiver denies any additional medications. Patient admits to having a responsible adult care for them at home for at least 24 hours after surgery. Patient encouraged to use gown warming system and informed that using said warming gown to regulate body temperature prior to a procedure has been shown to help reduce the risks of blood clots and infection. Patient's pharmacy of choice verified and documented in PTA medication section. Dual skin assessment & fall risk band verification completed with Miya Case RN.

## 2023-08-14 NOTE — DISCHARGE INSTRUCTIONS
[facility]   Patient Discharge Instructions    Charmayne San / [id number] : 1955    Admitted 2023 Discharged: 2023     IF YOU HAVE ANY PROBLEMS ONCE YOU ARE AT HOME CALL THE FOLLOWING NUMBERS:   Main office number: [phone]    Your follow up appointment to see either Dr. Coreen Severance PA-C, or Gabrielle Mcqueen PA-C as scheduled in 2 weeks. If you are unsure of your appointment date call the office at [phone]. Medication Instructions     Resume your home medictions as directed, you may have directed not to resume supplements until after your follow up. A prescription for pain medication has been given   It is important that you take the medication exactly as they are prescribed. Keep your medication in the bottles provided by the pharmacist and keep a list of the medication names, dosages, and times to be taken in your wallet. Do not take other medications without consulting your doctor. What to do at [facility] your prehospital diet. If you have excessive nausea or vomitting call your doctor's office. Be sure to maintain adequate fluid intake. Some pain medications may cause constipation. Remember to drink fluids, stay as active as possible, and eat plenty of fiber-rich foods. Begin In-Home Physical Therapy; 3 times a week to work on gait training, range of motion, strengthening, and weight bearing exercises as tolerable. Continue to use your walker or cane when walking. May progress from the walker to a cane to complete total bearing as tolerable. Patient may shower. Wrap incision with plastic wrap/covering to prevent incision from getting wet. Avoid complete immersion. YOUR DRESSING SHOULD BE CHANGED BY YOUR HOME PHYSICAL THERAPIST 5-7 AFTER SURGERY ACCORDING TO THE DATE WRITTEN ON YOUR DRESSING.           When to Call    - Call if you have a temperature greater then 101  - Unable to keep food

## 2023-08-14 NOTE — OP NOTE
[facility]  Total Hip Arthroplasty      Patient: Jesus Alberto Cool MRN: [id number]  SSN: [id number]    YOB: 1955  Age: 79 y.o. Sex: female      Date of Surgery: 8/14/2023   Preoperative Diagnosis: Osteoarthritis of right hip, unspecified osteoarthritis type [M16.11]   Postoperative Diagnosis: Same   Location: [facility]  Surgeon: Елена Martinez MD  Assistant: Stacie Damon PA-C    Anesthesia: General    Procedure: Total Right Hip Arthroplasty    Findings: Degenerative joint disease of the right hip. Estimated Blood Loss: 200ml    Specimens: None    Complications: none    Implants:   Implant Name Type Inv. Item Serial No.  Lot No. LRB No. Used Action   LINER ACET OD54MM ID36MM NEUT LEGEND - [id number]  LINER ACET OD54MM ID36MM NEUT LEGEND  ORTHO DEVELOPMENT [employer] [id number] Right 1 Implanted   SHELL ACET OD54MM 3 H HIGHLY POR HMSPHR LEGEND - [id number]  SHELL ACET OD54MM 3 H HIGHLY POR HMSPHR LEGEND  ORTHO DEVELOPMENT [employer] [id number] Right 1 Implanted   STEM FEM SZ 13 STD HIP CLLR MOD ENTRADA - [id number]  STEM FEM SZ 13 STD HIP CLLR MOD ENTRADA  ORTHO DEVELOPMENT [employer] [id number] Right 1 Implanted   HEAD FEM +3MM [id number] 12 14 TAPR OFFSET DELT CERAMIC - [id number]  HEAD FEM +3MM [id number] 12 14 TAPR OFFSET DELT CERAMIC  ORTHO DEVELOPMENT [employer] [id number] Right 1 Implanted       Procedure Detail:  After the patient was brought to the operating suite, She was effectively anesthetized using general anesthesia, then transferred to the [place] table and secured in a standard fashion. Her right hip was then prepped and draped in a normal sterile orthopedic fashion. She was given appropriate intravenous antibiotics preoperatively. After a proper timeout was performed, a direct anterior approach to the hip was performed using a short Brand-Rogers interval. Anterior capsulotomy was performed. The degenerative changes of the hip were noted.

## 2023-08-14 NOTE — INTERVAL H&P NOTE
Update History & Physical    The patient's History and Physical of August 13, 2023 was reviewed with the patient and I examined the patient. There was no change. The surgical site was confirmed by the patient and me. Plan: The risks, benefits, expected outcome, and alternative to the recommended procedure have been discussed with the patient. Patient understands and wants to proceed with the procedure.      Electronically signed by Davonte Padgett MD on 8/14/2023 at 7:08 AM

## 2023-08-14 NOTE — PERIOP NOTE
Dr. Rianna Troy aware of patient's uncontrolled  pain, order to give 1 mg Dilaudid , 50 mcg Fentanyl IV.

## 2023-08-14 NOTE — PERIOP NOTE
TRANSFER - IN REPORT:    Verbal report received from [address]  on [address]  being received from OR  for routine progression of patient care      Report consisted of patient's Situation, Background, Assessment and   Recommendations(SBAR). Information from the following report(s) Adult Overview, Surgery Report, Intake/Output, and MAR was reviewed with the receiving nurse. Opportunity for questions and clarification was provided. Assessment completed upon patient's arrival to unit and care assumed.

## 2023-08-14 NOTE — PROGRESS NOTES
Physical Therapy Goals:  Pt goals to be met in 1 day:  Pt will be able to perform supine<>sit SBA for transfer ease at home. Pt will be able to perform sit<>stand SBA for increased ability to transfer at home safely. Pt will be able to participate in gait training >100' w/ RW, WBAT, GB and CGA/SBA for improved ability in home upon d/c. Pt will be able to perform stair training step to pattern, B/U rail and CGA to obtain safe entry into home upon d/c. Pt will be educated regarding HEP per MD protocol for optimal AROM/strength outcomes. [x]  Patient has met MD mobilization cripatricia for d/c home   [x]  Recommend HH with 24 hour adult care   []  Benefit from additional acute PT session to address:      PHYSICAL THERAPY EVALUATION    Patient: Raymundo Baumgarten (85 y.o. female)  Date: 8/14/2023  Primary Diagnosis: Osteoarthritis of right hip, unspecified osteoarthritis type [M16.11]  Procedure(s) (LRB):  RIGHT TOTAL HIP REPLACEMENT ANTERIOR APPROACH WITH C-ARM (Right) Day of Surgery   Precautions: Fall Risk, Weight Bearing, Right Lower Extremity Weight Bearing: Weight Bearing As Tolerated,  ,  ,  ,  ,  , Hip Precautions: Anterior hip precautions  PLOF: Independent    ASSESSMENT :  Based on the objective data described below, the patient presents with decreased mobility in regards to bed mobility, transfers, gait quality, gait tolerance, balance, stair negotiation and safety due to R MELECIO surgery. Decreased AROM of R hip, dec strength R hip, pain in R hip, decreased sensation of R hip, also impacting functional mobility. Using numerical pain scale, pt rated pain 5/10 pre/during/post session. Pt and caregiver ed regarding mobility safety, WB, HEP, ice application/use, elevation, environmental safety and home safe techniques. Pt sitting in recliner upon arrival.  Able to perform sit<>stand w/ CGA/SBA. Safety vc required throughout session to reinforce safety.   Gait training using RW, GB, WBAT and CGA w/ antalgic
colon cancer in father     GERD (gastroesophageal reflux disease)     GERD (gastroesophageal reflux disease)     H/O being hospitalized 2022    SOB, UTI, PE    Hypothyroidism     since 30-40 years ago, does not need to follow endocrinology    Menopause     age 48    Osteoarthritis     knees, right hip, lower back, [place]    Osteoarthritis of right hip 2023    Pulmonary embolism ([address]) 2022    multiple, finished eliquis 2023, aspirin managed by Giancarlo Hawkins    Rectocele     Sleep apnea     not using CPAP     (spontaneous vaginal delivery)      (spontaneous vaginal delivery)      Past Surgical History:   Procedure Laterality Date    COLONOSCOPY N/A 2021    COLONOSCOPY performed by Melanie To MD at [facility] ENDOSCOPY    COLONOSCOPY  2015    ORTHOPEDIC SURGERY  1977    left dislocation of shoulder and pinning    OTHER SURGICAL HISTORY      breast lift    OTHER SURGICAL HISTORY      tummy tuck        TOTAL KNEE ARTHROPLASTY Left 2013    TOTAL KNEE ARTHROPLASTY Right      Barriers to Learning/Limitations: physical, post-anesthesia  Compensate with: visual, verbal, tactile, kinesthetic cues/model    Home Situation:   Social/Functional History  Lives With: Spouse  Type of Home: House  Home Layout: One level  Home Access: Stairs to enter without rails  Entrance Stairs - Number of Steps: 2  Bathroom Shower/Tub: Walk-in shower  Bathroom Toilet: Handicap height  Home Equipment: Paddy Hey, rolling, Rollator, Cane  []  Right hand dominant   []  Left hand dominant    Cognitive/Behavioral Status:  Orientation Level: Oriented to place;Oriented to situation;Oriented to person  WNL    Coordination: BUE  Coordination: Generally decreased, functional    Balance:  Balance  Sitting: Intact  Standing: Impaired  Standing - Static: Constant support; Fair  Standing - Dynamic: Fair;Constant support    Strength: BUE  Strength: Generally decreased, functional    Tone & Sensation:

## (undated) DEVICE — SOLUTION IRRIG 500ML 0.9% SOD CHLO USP POUR PLAS BTL

## (undated) DEVICE — SUTURE VCRL + SZ 1 L36IN ABSRB UD L36MM CT-1 1/2 CIR VCP947H

## (undated) DEVICE — GRIPPER SURGICAL RETRACTOR DISP

## (undated) DEVICE — BLADE ES L6IN ELASTOMERIC COAT EXT DURABLE BEND UPTO 90DEG

## (undated) DEVICE — ELECTRODE PT RET AD L9FT HI MOIST COND ADH HYDRGEL CORDED

## (undated) DEVICE — SOLUTION IV 100ML 0.9% SOD CHL DIL INJ

## (undated) DEVICE — SOLUTION IV NACL 0.9% 100 ML FLX CONTAINER

## (undated) DEVICE — Device

## (undated) DEVICE — PENCIL ES L3M ROCK SWCH S STL HEX LOK BLDE ELECTRD HOLSTER

## (undated) DEVICE — STRYKER PERFORMANCE SERIES SAGITTAL BLADE: Brand: STRYKER PERFORMANCE SERIES

## (undated) DEVICE — GARMENT,MEDLINE,DVT,INT,CALF,MED, GEN2: Brand: MEDLINE

## (undated) DEVICE — CANNULA CUSH AD W/ 14FT TBG

## (undated) DEVICE — SYR 5ML 1/5 GRAD LL NSAF LF --

## (undated) DEVICE — CATH SUC CTRL PRT TRIFLO 14FR --

## (undated) DEVICE — TRAP SPEC COLL POLYP POLYSTYR --

## (undated) DEVICE — TRNQT TEXT 1X18IN BLU LF DISP -- CONVERT TO ITEM 362165

## (undated) DEVICE — NDL PRT INJ NSAF BLNT 18GX1.5 --

## (undated) DEVICE — ZIP 16 SURGICAL SKIN CLOSURE DEVICE: Brand: ZIP 16 SURGICAL SKIN CLOSURE DEVICE

## (undated) DEVICE — SINGLE PORT MANIFOLD: Brand: NEPTUNE 2

## (undated) DEVICE — WEREWOLF FASTSEAL 6.0 HEMOSTASIS WAND: Brand: FASTSEAL 6.0 HEMOSTASIS WAND

## (undated) DEVICE — SYR 3ML LL TIP 1/10ML GRAD --

## (undated) DEVICE — PACK PROCEDURE SURG TOT HIP ANTR CARTER CUST

## (undated) DEVICE — SPONGE GZ W4XL4IN RAYON POLY 4 PLY NONWOVEN FASTER WICKING

## (undated) DEVICE — SYRINGE 50ML E/T

## (undated) DEVICE — CATH IV SAFE STR 22GX1IN BLU -- PROTECTIV PLUS

## (undated) DEVICE — KENDALL RADIOLUCENT FOAM MONITORING ELECTRODE RECTANGULAR SHAPE: Brand: KENDALL

## (undated) DEVICE — TUBING, SUCTION, 1/4" X 12', STRAIGHT: Brand: MEDLINE

## (undated) DEVICE — MAJ-1414 SINGLE USE ADPATER BIOPSY VALV: Brand: SINGLE USE ADAPTOR BIOPSY VALVE

## (undated) DEVICE — SUTURE VCRL + SZ 2-0 L36IN ABSRB UD L36MM CT-1 1/2 CIR VCP945H

## (undated) DEVICE — SPONGE GZ W4XL4IN COT 12 PLY TYP VII WVN C FLD DSGN

## (undated) DEVICE — NDL FLTR TIP 5 MIC 18GX1.5IN --

## (undated) DEVICE — SET ADMIN 16ML TBNG L100IN 2 Y INJ SITE IV PIGGY BK DISP

## (undated) DEVICE — SOLUTION IV 500ML 0.9% SOD CHL FLX CONT

## (undated) DEVICE — SOLUTION IV 1000ML LAC RINGERS PH 6.5 INJ USP VIAFLX PLAS

## (undated) DEVICE — WRISTBAND ID AD W2.5XL9.5CM RED VYN ADH CLSR UNI-PRINT